# Patient Record
Sex: MALE | Race: WHITE | Employment: FULL TIME | URBAN - METROPOLITAN AREA
[De-identification: names, ages, dates, MRNs, and addresses within clinical notes are randomized per-mention and may not be internally consistent; named-entity substitution may affect disease eponyms.]

---

## 2017-07-04 ENCOUNTER — HOSPITAL ENCOUNTER (INPATIENT)
Facility: HOSPITAL | Age: 37
LOS: 6 days | Discharge: HOME | DRG: 432 | End: 2017-07-10
Attending: EMERGENCY MEDICINE | Admitting: HOSPITALIST
Payer: MEDICAID

## 2017-07-04 DIAGNOSIS — K70.31 ALCOHOLIC CIRRHOSIS OF LIVER WITH ASCITES (HCC): ICD-10-CM

## 2017-07-04 DIAGNOSIS — R17 JAUNDICE: Primary | ICD-10-CM

## 2017-07-04 PROBLEM — N17.9 ACUTE RENAL FAILURE (HCC): Status: ACTIVE | Noted: 2017-07-04

## 2017-07-04 PROBLEM — D68.9 COAGULOPATHY (HCC): Status: ACTIVE | Noted: 2017-07-04

## 2017-07-04 PROBLEM — E80.6 HYPERBILIRUBINEMIA: Status: ACTIVE | Noted: 2017-07-04

## 2017-07-04 LAB
ALBUMIN SERPL-MCNC: 2.4 G/DL (ref 3.5–4.8)
ALP LIVER SERPL-CCNC: 229 U/L (ref 45–117)
ALT SERPL-CCNC: 41 U/L (ref 17–63)
AST SERPL-CCNC: 111 U/L (ref 15–41)
BASOPHILS # BLD AUTO: 0.12 X10(3) UL (ref 0–0.1)
BASOPHILS NFR BLD AUTO: 0.8 %
BILIRUB SERPL-MCNC: 26.5 MG/DL (ref 0.1–2)
BUN BLD-MCNC: 12 MG/DL (ref 8–20)
CALCIUM BLD-MCNC: 8.8 MG/DL (ref 8.3–10.3)
CHLORIDE: 95 MMOL/L (ref 101–111)
CO2: 29 MMOL/L (ref 22–32)
CREAT BLD-MCNC: 1.36 MG/DL (ref 0.7–1.3)
EOSINOPHIL # BLD AUTO: 0.09 X10(3) UL (ref 0–0.3)
EOSINOPHIL NFR BLD AUTO: 0.6 %
ERYTHROCYTE [DISTWIDTH] IN BLOOD BY AUTOMATED COUNT: 15.2 % (ref 11.5–16)
GLUCOSE BLD-MCNC: 119 MG/DL (ref 70–99)
HCT VFR BLD AUTO: 37.5 % (ref 37–53)
HGB BLD-MCNC: 13.8 G/DL (ref 13–17)
IMMATURE GRANULOCYTE COUNT: 0.22 X10(3) UL (ref 0–1)
IMMATURE GRANULOCYTE RATIO %: 1.5 %
INR BLD: 1.98 (ref 0.89–1.11)
LIPASE: 414 U/L (ref 73–393)
LYMPHOCYTES # BLD AUTO: 0.96 X10(3) UL (ref 0.9–4)
LYMPHOCYTES NFR BLD AUTO: 6.7 %
M PROTEIN MFR SERPL ELPH: 6.4 G/DL (ref 6.1–8.3)
MCH RBC QN AUTO: 34.9 PG (ref 27–33.2)
MCHC RBC AUTO-ENTMCNC: 36.8 G/DL (ref 31–37)
MCV RBC AUTO: 94.9 FL (ref 80–99)
MONOCYTES # BLD AUTO: 1.28 X10(3) UL (ref 0.1–0.6)
MONOCYTES NFR BLD AUTO: 9 %
NEUTROPHIL ABS PRELIM: 11.56 X10 (3) UL (ref 1.3–6.7)
NEUTROPHILS # BLD AUTO: 11.56 X10(3) UL (ref 1.3–6.7)
NEUTROPHILS NFR BLD AUTO: 81.4 %
PLATELET # BLD AUTO: 162 10(3)UL (ref 150–450)
POTASSIUM SERPL-SCNC: 3.4 MMOL/L (ref 3.6–5.1)
PSA SERPL DL<=0.01 NG/ML-MCNC: 22.8 SECONDS (ref 12–14.3)
RBC # BLD AUTO: 3.95 X10(6)UL (ref 4.3–5.7)
RED CELL DISTRIBUTION WIDTH-SD: 52.7 FL (ref 35.1–46.3)
SODIUM SERPL-SCNC: 133 MMOL/L (ref 136–144)
WBC # BLD AUTO: 14.2 X10(3) UL (ref 4–13)

## 2017-07-04 PROCEDURE — 99223 1ST HOSP IP/OBS HIGH 75: CPT | Performed by: HOSPITALIST

## 2017-07-04 RX ORDER — MORPHINE SULFATE 4 MG/ML
2 INJECTION, SOLUTION INTRAMUSCULAR; INTRAVENOUS EVERY 2 HOUR PRN
Status: DISCONTINUED | OUTPATIENT
Start: 2017-07-04 | End: 2017-07-09

## 2017-07-04 RX ORDER — ONDANSETRON 2 MG/ML
4 INJECTION INTRAMUSCULAR; INTRAVENOUS EVERY 6 HOURS PRN
Status: DISCONTINUED | OUTPATIENT
Start: 2017-07-04 | End: 2017-07-10

## 2017-07-04 RX ORDER — MORPHINE SULFATE 4 MG/ML
4 INJECTION, SOLUTION INTRAMUSCULAR; INTRAVENOUS EVERY 2 HOUR PRN
Status: DISCONTINUED | OUTPATIENT
Start: 2017-07-04 | End: 2017-07-09

## 2017-07-04 RX ORDER — SODIUM PHOSPHATE, DIBASIC AND SODIUM PHOSPHATE, MONOBASIC 7; 19 G/133ML; G/133ML
1 ENEMA RECTAL ONCE AS NEEDED
Status: DISCONTINUED | OUTPATIENT
Start: 2017-07-04 | End: 2017-07-10

## 2017-07-04 RX ORDER — MORPHINE SULFATE 4 MG/ML
4 INJECTION, SOLUTION INTRAMUSCULAR; INTRAVENOUS ONCE
Status: COMPLETED | OUTPATIENT
Start: 2017-07-04 | End: 2017-07-04

## 2017-07-04 RX ORDER — POLYETHYLENE GLYCOL 3350 17 G/17G
17 POWDER, FOR SOLUTION ORAL DAILY PRN
Status: DISCONTINUED | OUTPATIENT
Start: 2017-07-04 | End: 2017-07-10

## 2017-07-04 RX ORDER — MORPHINE SULFATE 4 MG/ML
1 INJECTION, SOLUTION INTRAMUSCULAR; INTRAVENOUS EVERY 2 HOUR PRN
Status: DISCONTINUED | OUTPATIENT
Start: 2017-07-04 | End: 2017-07-09

## 2017-07-04 RX ORDER — DOCUSATE SODIUM 100 MG/1
100 CAPSULE, LIQUID FILLED ORAL 2 TIMES DAILY
Status: DISCONTINUED | OUTPATIENT
Start: 2017-07-04 | End: 2017-07-10

## 2017-07-04 RX ORDER — POTASSIUM CHLORIDE 20 MEQ/1
40 TABLET, EXTENDED RELEASE ORAL EVERY 4 HOURS
Status: COMPLETED | OUTPATIENT
Start: 2017-07-04 | End: 2017-07-04

## 2017-07-04 RX ORDER — BISACODYL 10 MG
10 SUPPOSITORY, RECTAL RECTAL
Status: DISCONTINUED | OUTPATIENT
Start: 2017-07-04 | End: 2017-07-10

## 2017-07-04 NOTE — ED PROVIDER NOTES
Patient Seen in: [facility] Emergency Department    History   Patient presents with:  Abdomen/Flank Pain (GI/)  Jaundice (gastrointestinal, hematologic)    Stated Complaint: abdominal pain x 2 weeks, jaundice    HPI    60-year-old white male who p conjunctiva is clear. Sclerae is icteric. Neck is supple. Lungs are clear to auscultation bilaterally.   Heart is regular rate and rhythm without murmur gallop or rub    Abdomen is soft distended nontender to deep palpation there is no rebound or guard Please view results for these tests on the individual orders.    URINALYSIS WITH CULTURE REFLEX   RAINBOW DRAW BLUE   RAINBOW DRAW GOLD   RAINBOW DRAW LAVENDER   RAINBOW DRAW LIGHT GREEN       ================================================

## 2017-07-04 NOTE — ED INITIAL ASSESSMENT (HPI)
Pt c/o jaundice and abdominal pain for past 2-weeks. Pt presents w/ abdominal distention. Pt hx alcoholism. Pt has been sober for 16-days.

## 2017-07-04 NOTE — PLAN OF CARE
NURSING ADMISSION NOTE      Patient admitted via Cart   Oriented to room. Safety precautions initiated. Bed in low position. Call light in reach. Admission interview completed. Dr. Emma Joseph called for admission orders.

## 2017-07-04 NOTE — H&P
JUNG HOSPITALIST  History and Physical     Negar Dena Patient Status:  Inpatient    1980 MRN [id number]   [facility] 3NW-A Attending Merline Hartshorn [address] Day # 0 PCP Taras Ellis MD     Chief Complaint: Jaundice Pulse 93   Temp 98.5 °F (36.9 °C) (Oral)   Resp 20   Ht 5' 11\" (1.803 m)   Wt 168 lb (76.2 kg)   SpO2 99%   BMI 23.43 kg/m²   General: No acute distress. Alert and oriented x 3. HEENT: Normocephalic atraumatic. Moist mucous membranes. EOM-I. PERRLA.  An withdrawal. Will still observe for any symptoms. 4. Hyponatremia-mild form hypokalemia-we will replace per protocol. 5. Renal failure-possible hepatorenal component. Will continue to monitor kidney function.         Quality:  · DVT Prophylaxis: SCD's  ·

## 2017-07-05 ENCOUNTER — APPOINTMENT (OUTPATIENT)
Dept: ULTRASOUND IMAGING | Facility: HOSPITAL | Age: 37
DRG: 432 | End: 2017-07-05
Attending: HOSPITALIST
Payer: MEDICAID

## 2017-07-05 ENCOUNTER — APPOINTMENT (OUTPATIENT)
Dept: ULTRASOUND IMAGING | Facility: HOSPITAL | Age: 37
DRG: 432 | End: 2017-07-05
Attending: NURSE PRACTITIONER
Payer: MEDICAID

## 2017-07-05 LAB
ALBUMIN SERPL-MCNC: 2.3 G/DL (ref 3.5–4.8)
ALBUMIN, OTHER BODY FLUID: 0.8 G/DL
ALP LIVER SERPL-CCNC: 227 U/L (ref 45–117)
ALT SERPL-CCNC: 38 U/L (ref 17–63)
AST SERPL-CCNC: 114 U/L (ref 15–41)
BASOPHIL PERITONEAL FLUID: 0 %
BASOPHILS # BLD AUTO: 0.09 X10(3) UL (ref 0–0.1)
BASOPHILS NFR BLD AUTO: 0.6 %
BILIRUB SERPL-MCNC: 26 MG/DL (ref 0.1–2)
BUN BLD-MCNC: 11 MG/DL (ref 8–20)
CALCIUM BLD-MCNC: 8.9 MG/DL (ref 8.3–10.3)
CHLORIDE: 98 MMOL/L (ref 101–111)
CO2: 27 MMOL/L (ref 22–32)
CREAT BLD-MCNC: 1.18 MG/DL (ref 0.7–1.3)
EOSINOPHIL # BLD AUTO: 0.11 X10(3) UL (ref 0–0.3)
EOSINOPHIL NFR BLD AUTO: 0.8 %
EOSINOPHILS PERITONEAL FLUID: 0 %
ERYTHROCYTE [DISTWIDTH] IN BLOOD BY AUTOMATED COUNT: 15.7 % (ref 11.5–16)
GLUCOSE BLD-MCNC: 80 MG/DL (ref 70–99)
GLUCOSE UR STRIP.AUTO-MCNC: NEGATIVE MG/DL
HCT VFR BLD AUTO: 35.2 % (ref 37–53)
HGB BLD-MCNC: 12.7 G/DL (ref 13–17)
IMMATURE GRANULOCYTE COUNT: 0.27 X10(3) UL (ref 0–1)
IMMATURE GRANULOCYTE RATIO %: 1.9 %
INR BLD: 1.87 (ref 0.89–1.11)
KETONES UR STRIP.AUTO-MCNC: NEGATIVE MG/DL
LEUKOCYTE ESTERASE UR QL STRIP.AUTO: NEGATIVE
LYMPHOCYTE PERITONEAL FLUID: 37 %
LYMPHOCYTES # BLD AUTO: 1.39 X10(3) UL (ref 0.9–4)
LYMPHOCYTES NFR BLD AUTO: 9.6 %
M PROTEIN MFR SERPL ELPH: 6.2 G/DL (ref 6.1–8.3)
MCH RBC QN AUTO: 34.8 PG (ref 27–33.2)
MCHC RBC AUTO-ENTMCNC: 36.1 G/DL (ref 31–37)
MCV RBC AUTO: 96.4 FL (ref 80–99)
MESOTHELIAL PERITONEAL FLUID: 4 %
MONO/MAC/HISTIO PERITONEAL: 49 %
MONOCYTES # BLD AUTO: 1.44 X10(3) UL (ref 0.1–0.6)
MONOCYTES NFR BLD AUTO: 9.9 %
NEUTROPHIL ABS PRELIM: 11.19 X10 (3) UL (ref 1.3–6.7)
NEUTROPHILS # BLD AUTO: 11.19 X10(3) UL (ref 1.3–6.7)
NEUTROPHILS NFR BLD AUTO: 77.2 %
NEUTROPHILS PERITONEAL FLUID: 10 %
NITRITE UR QL STRIP.AUTO: NEGATIVE
PH UR STRIP.AUTO: 5 [PH] (ref 4.5–8)
PLATELET # BLD AUTO: 139 10(3)UL (ref 150–450)
POTASSIUM SERPL-SCNC: 4.4 MMOL/L (ref 3.6–5.1)
PROT UR STRIP.AUTO-MCNC: NEGATIVE MG/DL
PSA SERPL DL<=0.01 NG/ML-MCNC: 21.8 SECONDS (ref 12–14.3)
RBC # BLD AUTO: 3.65 X10(6)UL (ref 4.3–5.7)
RBC PERITONEAL FLUID: <3000 /MM3
RBC UR QL AUTO: NEGATIVE
RED CELL DISTRIBUTION WIDTH-SD: 55.5 FL (ref 35.1–46.3)
SODIUM SERPL-SCNC: 132 MMOL/L (ref 136–144)
SP GR UR STRIP.AUTO: 1.02 (ref 1–1.03)
TOTAL CELLS COUNTED: 100
TOTAL PROTEIN, OTHER BODY FLUID: 1.6 G/DL
UROBILINOGEN UR STRIP.AUTO-MCNC: 4 MG/DL
WBC # BLD AUTO: 14.5 X10(3) UL (ref 4–13)
WBC PERITONEAL FLUID: 67 /MM3

## 2017-07-05 PROCEDURE — 99232 SBSQ HOSP IP/OBS MODERATE 35: CPT | Performed by: HOSPITALIST

## 2017-07-05 PROCEDURE — 81003 URINALYSIS AUTO W/O SCOPE: CPT | Performed by: HOSPITALIST

## 2017-07-05 PROCEDURE — 0W9G3ZZ DRAINAGE OF PERITONEAL CAVITY, PERCUTANEOUS APPROACH: ICD-10-PCS | Performed by: RADIOLOGY

## 2017-07-05 PROCEDURE — 49083 ABD PARACENTESIS W/IMAGING: CPT | Performed by: NURSE PRACTITIONER

## 2017-07-05 PROCEDURE — 76705 ECHO EXAM OF ABDOMEN: CPT | Performed by: HOSPITALIST

## 2017-07-05 RX ORDER — DOXEPIN HYDROCHLORIDE 50 MG/1
1 CAPSULE ORAL DAILY
Status: DISCONTINUED | OUTPATIENT
Start: 2017-07-05 | End: 2017-07-10

## 2017-07-05 RX ORDER — FOLIC ACID 1 MG/1
1 TABLET ORAL DAILY
Status: DISCONTINUED | OUTPATIENT
Start: 2017-07-05 | End: 2017-07-10

## 2017-07-05 NOTE — CM/SW NOTE
DIANNE reviewed chart. Patient has stated to staff he replied to [facility], has submitted all his documentation.   DIANNE spoke to [facility] @ 11:26am- he will review chart and f/u with MSW.    11:45am: [facility] called back, will come see patient today to screen for medicaid

## 2017-07-05 NOTE — PAYOR COMM NOTE
--------------  ADMISSION REVIEW     Payor: N/A      7/4  ED    39YEARS OLD  Patient Seen in: [facility] Emergency Department     History   Patient presents with:  Abdomen/Flank Pain (GI/)  Jaundice      Stated Complaint: abdominal pain x 2 weeks, Bilirubin, Total 26.5 (*)       Albumin 2.4 (*)       Sodium 133 (*)       Potassium 3.4 (*)       Chloride 95 (*)       All other components within normal limits   PROTHROMBIN TIME (PT) - Abnormal; Notable for the following:      PT 22.8 (*)       INR 1.9

## 2017-07-05 NOTE — PLAN OF CARE
GASTROINTESTINAL - ADULT    • Minimal or absence of nausea and vomiting Progressing    • Maintains adequate nutritional intake (undernourished) Progressing        METABOLIC/FLUID AND ELECTROLYTES - ADULT    • Electrolytes maintained within normal limits Pr

## 2017-07-05 NOTE — CONSULTS
[facility]                       Gastroenterology [address] [facility] Gastroenterology    Oskar Andujar Patient Status:  Inpatient    1980 MRN [id number]   [facility] 3NW-A Attending Ayo Lazaro MD   [facility] Day # 1 PCP Arleene Homans currently requiring IV narcotics for his back pain and has not experienced periods of confusion or overt GI bleeding  PMHx: History reviewed. No pertinent past medical history. PSHx: History reviewed. No pertinent surgical history.   Medications:   [COMPLE The patient reports no easy bruising, frequent gum bleeding or nose bleeding;   The patient has no history of known chronic anemia            Dermatologic: The patient reports no recent rashes or chronic skin disorders            Rheumatologic: The patient 07/05/2017   CL 98 07/05/2017   CO2 27.0 07/05/2017   GLU 80 07/05/2017   CA 8.9 07/05/2017   ALB 2.3 07/05/2017   ALKPHO 227 07/05/2017   BILT 26.0 07/05/2017    07/05/2017   ALT 38 07/05/2017   INR 1.98 07/04/2017   PTP 22.8 07/04/2017    0 culture, cytology, albumin, and protein; albumin replacement pending total volume removed   2. STAT INR, blood cultures, UA, acute hepatitis panel; will consider FFP if INR above 2  3. Consider starting Prednisolone pending infectious work-up   4.  EGD unde

## 2017-07-05 NOTE — PROGRESS NOTES
JUNG HOSPITALIST  Progress Note     Alexys Karlene Patient Status:  Inpatient    1980 MRN [id number]   [facility] 3NW-A Attending Yazmin Soto MD   Hosp Day # 1 PCP Meeta Hills MD     Chief Complaint: jaundice    S: Patient f in detail with him  2. Motivated to continue with abstinence  3. Hypokalemia  1. Resolved  4. Hyponatremia  5. Coagulopathy  1. Monitor  6. Reactive Leukocytosis  7. thrombocytopenia    Plan of care: as above.  Paracentesis today    Quality:  · DVT Prophyla

## 2017-07-06 ENCOUNTER — ANESTHESIA EVENT (OUTPATIENT)
Dept: ENDOSCOPY | Facility: HOSPITAL | Age: 37
DRG: 432 | End: 2017-07-06
Payer: MEDICAID

## 2017-07-06 LAB
ALBUMIN SERPL-MCNC: 2.1 G/DL (ref 3.5–4.8)
ALP LIVER SERPL-CCNC: 217 U/L (ref 45–117)
ALT SERPL-CCNC: 35 U/L (ref 17–63)
AST SERPL-CCNC: 108 U/L (ref 15–41)
BASOPHILS # BLD AUTO: 0.09 X10(3) UL (ref 0–0.1)
BASOPHILS NFR BLD AUTO: 0.6 %
BILIRUB SERPL-MCNC: 24.7 MG/DL (ref 0.1–2)
BUN BLD-MCNC: 12 MG/DL (ref 8–20)
CALCIUM BLD-MCNC: 8.8 MG/DL (ref 8.3–10.3)
CHLORIDE: 97 MMOL/L (ref 101–111)
CO2: 25 MMOL/L (ref 22–32)
CREAT BLD-MCNC: 1.22 MG/DL (ref 0.7–1.3)
EOSINOPHIL # BLD AUTO: 0.13 X10(3) UL (ref 0–0.3)
EOSINOPHIL NFR BLD AUTO: 0.9 %
ERYTHROCYTE [DISTWIDTH] IN BLOOD BY AUTOMATED COUNT: 15.5 % (ref 11.5–16)
GLUCOSE BLD-MCNC: 90 MG/DL (ref 70–99)
HAV IGM SER QL: 2.2 MG/DL (ref 1.7–3)
HAV IGM SER QL: NONREACTIVE
HBV CORE IGM SER QL: NONREACTIVE
HBV SURFACE AG SERPL QL IA: NONREACTIVE
HCT VFR BLD AUTO: 34.9 % (ref 37–53)
HEPATITIS C VIRUS AB INTERPRETATION: NONREACTIVE
HGB BLD-MCNC: 12.9 G/DL (ref 13–17)
IMMATURE GRANULOCYTE COUNT: 0.29 X10(3) UL (ref 0–1)
IMMATURE GRANULOCYTE RATIO %: 2 %
INR BLD: 1.99 (ref 0.89–1.11)
LYMPHOCYTES # BLD AUTO: 1.29 X10(3) UL (ref 0.9–4)
LYMPHOCYTES NFR BLD AUTO: 8.8 %
M PROTEIN MFR SERPL ELPH: 5.7 G/DL (ref 6.1–8.3)
MCH RBC QN AUTO: 35.1 PG (ref 27–33.2)
MCHC RBC AUTO-ENTMCNC: 37 G/DL (ref 31–37)
MCV RBC AUTO: 95.1 FL (ref 80–99)
MONOCYTES # BLD AUTO: 1.6 X10(3) UL (ref 0.1–0.6)
MONOCYTES NFR BLD AUTO: 11 %
NEUTROPHIL ABS PRELIM: 11.21 X10 (3) UL (ref 1.3–6.7)
NEUTROPHILS # BLD AUTO: 11.21 X10(3) UL (ref 1.3–6.7)
NEUTROPHILS NFR BLD AUTO: 76.7 %
PLATELET # BLD AUTO: 132 10(3)UL (ref 150–450)
POTASSIUM SERPL-SCNC: 4.4 MMOL/L (ref 3.6–5.1)
PSA SERPL DL<=0.01 NG/ML-MCNC: 22.9 SECONDS (ref 12–14.3)
RBC # BLD AUTO: 3.67 X10(6)UL (ref 4.3–5.7)
RED CELL DISTRIBUTION WIDTH-SD: 54.1 FL (ref 35.1–46.3)
SODIUM SERPL-SCNC: 130 MMOL/L (ref 136–144)
WBC # BLD AUTO: 14.6 X10(3) UL (ref 4–13)

## 2017-07-06 PROCEDURE — 99232 SBSQ HOSP IP/OBS MODERATE 35: CPT | Performed by: HOSPITALIST

## 2017-07-06 RX ORDER — FUROSEMIDE 40 MG/1
40 TABLET ORAL DAILY
Status: DISCONTINUED | OUTPATIENT
Start: 2017-07-06 | End: 2017-07-08

## 2017-07-06 RX ORDER — HYDROCODONE BITARTRATE AND ACETAMINOPHEN 5; 325 MG/1; MG/1
2 TABLET ORAL EVERY 4 HOURS PRN
Status: DISCONTINUED | OUTPATIENT
Start: 2017-07-06 | End: 2017-07-08

## 2017-07-06 RX ORDER — SPIRONOLACTONE 25 MG/1
100 TABLET ORAL DAILY
Status: DISCONTINUED | OUTPATIENT
Start: 2017-07-06 | End: 2017-07-08

## 2017-07-06 RX ORDER — TRAMADOL HYDROCHLORIDE 50 MG/1
50 TABLET ORAL EVERY 6 HOURS PRN
Status: DISCONTINUED | OUTPATIENT
Start: 2017-07-06 | End: 2017-07-10

## 2017-07-06 RX ORDER — HYDROCODONE BITARTRATE AND ACETAMINOPHEN 5; 325 MG/1; MG/1
1 TABLET ORAL EVERY 4 HOURS PRN
Status: DISCONTINUED | OUTPATIENT
Start: 2017-07-06 | End: 2017-07-08

## 2017-07-06 NOTE — ANESTHESIA PREPROCEDURE EVALUATION
PRE-OP EVALUATION    Patient Name: Naye Rios    Pre-op Diagnosis: Alcoholic cirrhosis of liver with ascites ([facility] [K70.31]    Procedure(s):  ESOPHAGOGASTRODUODENOSCOPY     Surgeon(s) and Role:     * Eran Fabian, DO - Primary    Pre-op vitals rev 7/4/17 encounter [facility] Encounter). Allergies: Review of patient's allergies indicates no known allergies. Anesthesia Evaluation    Patient summary reviewed.     Anesthetic Complications  (-) history of anesthetic complications         GI/Hepatic RIsks of aspiration, n/v, pain, dental trauma, awareness. Risks and plan d/w pt and pt's family - all questions answered.   Plan/risks discussed with: patient                Present on Admission:  • Coagulopathy ([address]  • Hyperbilirubinemia  • Acute renal f

## 2017-07-06 NOTE — PLAN OF CARE
PT A/O, 96% ON RA, REFUSED SCDS, ABDOMEN DISTENDED FIRM, PT STATES IT FEELS MUCH BETTER, DRESSING TO ABDOMEN WITH OLD DRAINAGE, VOIDING, TAKING MORPHINE FOR BACK PAIN WITH RELIEF, LABS THIS AM, TOLERATING REGULAR DIET  GASTROINTESTINAL - ADULT    • Minimal

## 2017-07-06 NOTE — PROGRESS NOTES
[facility]    Gastroenterology Follow-Up Note      Nidia Moralez Patient Status:  Inpatient    1980 MRN [id number]   [facility] 3NW-A Attending Kesha Villanueva MD   Hosp Day # 2 PCP Sven Mensah MD     Chief Complaint/Reaso with Dr. Ino Solis, transplant hepatology  *Strict etoh avoidance  *MVI, folic acid daily      I have spent > 26 total minutes with the patient >50% of visit was spent in counseling and coordination of care.       Krysten See  Gastroenterology/Advanced Endoscop

## 2017-07-06 NOTE — PROGRESS NOTES
JUNG HOSPITALIST  Progress Note     [facility] Patient Status:  Inpatient    1980 MRN [id number]   [facility] 3NW-A Attending Cecy Huff MD   Hosp Day # 2 PCP Phuc Cabrera MD     Chief Complaint: jaundice    S: Patient f Oral Daily   • spironolactone  100 mg Oral Daily   • multivitamin  1 tablet Oral Daily   • folic acid  1 mg Oral Daily   • docusate sodium  100 mg Oral BID       ASSESSMENT / PLAN:     1. Acute Alcohol Hepatitis with ascites and jaundice  1.  GI folllowing

## 2017-07-07 ENCOUNTER — ANESTHESIA (OUTPATIENT)
Dept: ENDOSCOPY | Facility: HOSPITAL | Age: 37
DRG: 432 | End: 2017-07-07
Payer: MEDICAID

## 2017-07-07 ENCOUNTER — SURGERY (OUTPATIENT)
Age: 37
End: 2017-07-07

## 2017-07-07 ENCOUNTER — APPOINTMENT (OUTPATIENT)
Dept: ULTRASOUND IMAGING | Facility: HOSPITAL | Age: 37
DRG: 432 | End: 2017-07-07
Attending: HOSPITALIST
Payer: MEDICAID

## 2017-07-07 LAB
ALBUMIN SERPL-MCNC: 2 G/DL (ref 3.5–4.8)
ALP LIVER SERPL-CCNC: 219 U/L (ref 45–117)
ALT SERPL-CCNC: 33 U/L (ref 17–63)
ANTIBODY SCREEN: NEGATIVE
AST SERPL-CCNC: 99 U/L (ref 15–41)
BASOPHILS # BLD AUTO: 0.08 X10(3) UL (ref 0–0.1)
BASOPHILS NFR BLD AUTO: 0.6 %
BILIRUB SERPL-MCNC: 25 MG/DL (ref 0.1–2)
BUN BLD-MCNC: 14 MG/DL (ref 8–20)
CALCIUM BLD-MCNC: 9 MG/DL (ref 8.3–10.3)
CHLORIDE: 93 MMOL/L (ref 101–111)
CO2: 26 MMOL/L (ref 22–32)
CREAT BLD-MCNC: 1.45 MG/DL (ref 0.7–1.3)
EOSINOPHIL # BLD AUTO: 0.14 X10(3) UL (ref 0–0.3)
EOSINOPHIL NFR BLD AUTO: 1 %
ERYTHROCYTE [DISTWIDTH] IN BLOOD BY AUTOMATED COUNT: 15.6 % (ref 11.5–16)
GLUCOSE BLD-MCNC: 84 MG/DL (ref 70–99)
HAV IGM SER QL: 2 MG/DL (ref 1.7–3)
HCT VFR BLD AUTO: 34.9 % (ref 37–53)
HGB BLD-MCNC: 12.8 G/DL (ref 13–17)
IMMATURE GRANULOCYTE COUNT: 0.38 X10(3) UL (ref 0–1)
IMMATURE GRANULOCYTE RATIO %: 2.8 %
INR BLD: 2.03 (ref 0.89–1.11)
LYMPHOCYTES # BLD AUTO: 1.21 X10(3) UL (ref 0.9–4)
LYMPHOCYTES NFR BLD AUTO: 8.9 %
M PROTEIN MFR SERPL ELPH: 5.6 G/DL (ref 6.1–8.3)
MCH RBC QN AUTO: 34.8 PG (ref 27–33.2)
MCHC RBC AUTO-ENTMCNC: 36.7 G/DL (ref 31–37)
MCV RBC AUTO: 94.8 FL (ref 80–99)
MONOCYTES # BLD AUTO: 1.53 X10(3) UL (ref 0.1–0.6)
MONOCYTES NFR BLD AUTO: 11.2 %
NEUTROPHIL ABS PRELIM: 10.3 X10 (3) UL (ref 1.3–6.7)
NEUTROPHILS # BLD AUTO: 10.3 X10(3) UL (ref 1.3–6.7)
NEUTROPHILS NFR BLD AUTO: 75.5 %
PLATELET # BLD AUTO: 114 10(3)UL (ref 150–450)
POTASSIUM SERPL-SCNC: 4 MMOL/L (ref 3.6–5.1)
PSA SERPL DL<=0.01 NG/ML-MCNC: 23.3 SECONDS (ref 12–14.3)
RBC # BLD AUTO: 3.68 X10(6)UL (ref 4.3–5.7)
RED CELL DISTRIBUTION WIDTH-SD: 54.2 FL (ref 35.1–46.3)
RH BLOOD TYPE: POSITIVE
SODIUM SERPL-SCNC: 130 MMOL/L (ref 136–144)
WBC # BLD AUTO: 13.6 X10(3) UL (ref 4–13)

## 2017-07-07 PROCEDURE — 76700 US EXAM ABDOM COMPLETE: CPT | Performed by: HOSPITALIST

## 2017-07-07 PROCEDURE — 0DJ08ZZ INSPECTION OF UPPER INTESTINAL TRACT, VIA NATURAL OR ARTIFICIAL OPENING ENDOSCOPIC: ICD-10-PCS | Performed by: INTERNAL MEDICINE

## 2017-07-07 PROCEDURE — 99232 SBSQ HOSP IP/OBS MODERATE 35: CPT | Performed by: HOSPITALIST

## 2017-07-07 PROCEDURE — 93975 VASCULAR STUDY: CPT | Performed by: HOSPITALIST

## 2017-07-07 RX ORDER — PROPRANOLOL HYDROCHLORIDE 10 MG/1
10 TABLET ORAL 2 TIMES DAILY
Status: DISCONTINUED | OUTPATIENT
Start: 2017-07-07 | End: 2017-07-10

## 2017-07-07 RX ORDER — SODIUM CHLORIDE 9 MG/ML
INJECTION, SOLUTION INTRAVENOUS ONCE
Status: DISCONTINUED | OUTPATIENT
Start: 2017-07-07 | End: 2017-07-10

## 2017-07-07 RX ORDER — ALBUMIN (HUMAN) 12.5 G/50ML
100 SOLUTION INTRAVENOUS ONCE
Status: COMPLETED | OUTPATIENT
Start: 2017-07-07 | End: 2017-07-07

## 2017-07-07 NOTE — PROGRESS NOTES
JUNG HOSPITALIST  Progress Note     Yolanda Freeman Patient Status:  Inpatient    1980 MRN [id number]   [facility] 3NW-A Attending Juvencio Campos MD   Hosp Day # 3 PCP Torin Mendoza MD     Chief Complaint: jaundice    S: Patient f Propranolol HCl  10 mg Oral BID   • Albumin Human  100 mL Intravenous Once   • furosemide  40 mg Oral Daily   • spironolactone  100 mg Oral Daily   • multivitamin  1 tablet Oral Daily   • folic acid  1 mg Oral Daily   • docusate sodium  100 mg Oral BID

## 2017-07-07 NOTE — PROGRESS NOTES
Patient sent for EGD. FFP continues without complication. Accompanied patient to Endo Lab with FFP running. FFP completed in Endo lab without complication. VSS. Afebrile.

## 2017-07-07 NOTE — INTERVAL H&P NOTE
Pre-op Diagnosis: Alcoholic cirrhosis of liver with ascites ([facility] [K70.31]    The above referenced H&P was reviewed by Medhat Mansfield DO on 7/7/2017, the patient was examined and no significant changes have occurred in the patient's condition since the [facility] (DP/SNF)

## 2017-07-07 NOTE — ANESTHESIA POSTPROCEDURE EVALUATION
[address] Patient Status:  Inpatient   Age/Gender 39year old male MRN [id number]   Location [facility]. Attending Cecy Huff MD   Hosp Day # 3 PCP Phuc Cabrera MD       Anesthesia Post-op Note    Procedure(s):

## 2017-07-07 NOTE — PROGRESS NOTES
Dr Valerio General contacted and informed of new critical result per US of Portal Vein Thrombosis. Verified orders to continue with FFP.

## 2017-07-07 NOTE — OPERATIVE REPORT
EGD       Keren Harvey Patient Status:  Inpatient    1980 MRN [id number]   [facility] ENDOSCOPY Attending Partha Bell MD   Hosp Day # 3 PCP Randy Barbosa MD       PREOPERATIVE DIAGNOSIS/INDICATION: Rocio Valencia A distal esophagitis. RECOMMENDATIONS:    1. Opt for propranolol 10 mg bid for esophageal varices rather than banding given coagulopathy. Will need to titrate as tolerated as outpatient. 2. Albumin infusion now for increasing creatinine.  May need to

## 2017-07-07 NOTE — H&P (VIEW-ONLY)
[facility]                       Gastroenterology [address] [facility] Gastroenterology    Payton Lester Patient Status:  Inpatient    1980 MRN [id number]   [facility] 3NW-A Attending Ligia Stanlye MD   [facility] Day # 1 SHANITA Alarcon currently requiring IV narcotics for his back pain and has not experienced periods of confusion or overt GI bleeding  PMHx: History reviewed. No pertinent past medical history. PSHx: History reviewed. No pertinent surgical history.   Medications:   [COMPLE The patient reports no easy bruising, frequent gum bleeding or nose bleeding;   The patient has no history of known chronic anemia            Dermatologic: The patient reports no recent rashes or chronic skin disorders            Rheumatologic: The patient 07/05/2017   CL 98 07/05/2017   CO2 27.0 07/05/2017   GLU 80 07/05/2017   CA 8.9 07/05/2017   ALB 2.3 07/05/2017   ALKPHO 227 07/05/2017   BILT 26.0 07/05/2017    07/05/2017   ALT 38 07/05/2017   INR 1.98 07/04/2017   PTP 22.8 07/04/2017    0 culture, cytology, albumin, and protein; albumin replacement pending total volume removed   2. STAT INR, blood cultures, UA, acute hepatitis panel; will consider FFP if INR above 2  3. Consider starting Prednisolone pending infectious work-up   4.  EGD unde

## 2017-07-08 ENCOUNTER — APPOINTMENT (OUTPATIENT)
Dept: MRI IMAGING | Facility: HOSPITAL | Age: 37
DRG: 432 | End: 2017-07-08
Attending: INTERNAL MEDICINE
Payer: MEDICAID

## 2017-07-08 LAB
ALBUMIN SERPL-MCNC: 2.2 G/DL (ref 3.5–4.8)
ALP LIVER SERPL-CCNC: 211 U/L (ref 45–117)
ALT SERPL-CCNC: 31 U/L (ref 17–63)
APTT PPP: 55.1 SECONDS (ref 25–34)
AST SERPL-CCNC: 89 U/L (ref 15–41)
BASOPHILS # BLD AUTO: 0.06 X10(3) UL (ref 0–0.1)
BASOPHILS NFR BLD AUTO: 0.5 %
BILIRUB SERPL-MCNC: 23.7 MG/DL (ref 0.1–2)
BLOOD TYPE BARCODE: 6200
BUN BLD-MCNC: 19 MG/DL (ref 8–20)
CALCIUM BLD-MCNC: 8.9 MG/DL (ref 8.3–10.3)
CHLORIDE: 94 MMOL/L (ref 101–111)
CO2: 28 MMOL/L (ref 22–32)
CREAT BLD-MCNC: 1.68 MG/DL (ref 0.7–1.3)
EOSINOPHIL # BLD AUTO: 0.19 X10(3) UL (ref 0–0.3)
EOSINOPHIL NFR BLD AUTO: 1.5 %
ERYTHROCYTE [DISTWIDTH] IN BLOOD BY AUTOMATED COUNT: 15.7 % (ref 11.5–16)
FIBRINOGEN: 278 MG/DL (ref 200–446)
GLUCOSE BLD-MCNC: 94 MG/DL (ref 70–99)
HCT VFR BLD AUTO: 33.9 % (ref 37–53)
HGB BLD-MCNC: 12.4 G/DL (ref 13–17)
IMMATURE GRANULOCYTE COUNT: 0.32 X10(3) UL (ref 0–1)
IMMATURE GRANULOCYTE RATIO %: 2.5 %
INR BLD: 1.99 (ref 0.89–1.11)
LYMPHOCYTES # BLD AUTO: 1.27 X10(3) UL (ref 0.9–4)
LYMPHOCYTES NFR BLD AUTO: 9.9 %
M PROTEIN MFR SERPL ELPH: 5.6 G/DL (ref 6.1–8.3)
MCH RBC QN AUTO: 34.7 PG (ref 27–33.2)
MCHC RBC AUTO-ENTMCNC: 36.6 G/DL (ref 31–37)
MCV RBC AUTO: 95 FL (ref 80–99)
MONOCYTES # BLD AUTO: 1.58 X10(3) UL (ref 0.1–0.6)
MONOCYTES NFR BLD AUTO: 12.3 %
NEUTROPHIL ABS PRELIM: 9.41 X10 (3) UL (ref 1.3–6.7)
NEUTROPHILS # BLD AUTO: 9.41 X10(3) UL (ref 1.3–6.7)
NEUTROPHILS NFR BLD AUTO: 73.3 %
NON GYNE INTERPRETATION: NEGATIVE
PLATELET # BLD AUTO: 118 10(3)UL (ref 150–450)
POTASSIUM SERPL-SCNC: 3.7 MMOL/L (ref 3.6–5.1)
PSA SERPL DL<=0.01 NG/ML-MCNC: 22.9 SECONDS (ref 12–14.3)
RBC # BLD AUTO: 3.57 X10(6)UL (ref 4.3–5.7)
RED CELL DISTRIBUTION WIDTH-SD: 54.4 FL (ref 35.1–46.3)
SODIUM SERPL-SCNC: 132 MMOL/L (ref 136–144)
WBC # BLD AUTO: 12.8 X10(3) UL (ref 4–13)

## 2017-07-08 PROCEDURE — 74183 MRI ABD W/O CNTR FLWD CNTR: CPT | Performed by: INTERNAL MEDICINE

## 2017-07-08 PROCEDURE — 99232 SBSQ HOSP IP/OBS MODERATE 35: CPT | Performed by: HOSPITALIST

## 2017-07-08 PROCEDURE — 99223 1ST HOSP IP/OBS HIGH 75: CPT | Performed by: INTERNAL MEDICINE

## 2017-07-08 RX ORDER — HYDROCODONE BITARTRATE AND ACETAMINOPHEN 5; 325 MG/1; MG/1
1 TABLET ORAL EVERY 4 HOURS PRN
Status: DISCONTINUED | OUTPATIENT
Start: 2017-07-08 | End: 2017-07-10

## 2017-07-08 RX ORDER — HYDROCODONE BITARTRATE AND ACETAMINOPHEN 5; 325 MG/1; MG/1
2 TABLET ORAL EVERY 4 HOURS PRN
Status: DISCONTINUED | OUTPATIENT
Start: 2017-07-08 | End: 2017-07-10

## 2017-07-08 RX ORDER — POTASSIUM CHLORIDE 20 MEQ/1
40 TABLET, EXTENDED RELEASE ORAL ONCE
Status: COMPLETED | OUTPATIENT
Start: 2017-07-08 | End: 2017-07-08

## 2017-07-08 NOTE — PLAN OF CARE
GASTROINTESTINAL - ADULT    • Minimal or absence of nausea and vomiting Progressing    • Maintains or returns to baseline bowel function Progressing    • Maintains adequate nutritional intake (undernourished) Progressing            Assumed pt care at 0730.

## 2017-07-08 NOTE — PROGRESS NOTES
JUNG HOSPITALIST  Progress Note     Francia Yi Patient Status:  Inpatient    1980 MRN [id number]   [facility] 3NW-A Attending Ernie Magaña MD   Hosp Day # 4 PCP Serena Hawkins MD     Chief Complaint: Hepatitis   S:  S/p ta chloride   Intravenous Once   • Propranolol HCl  10 mg Oral BID   • spironolactone  100 mg Oral Daily   • multivitamin  1 tablet Oral Daily   • folic acid  1 mg Oral Daily   • docusate sodium  100 mg Oral BID     ASSESSMENT / PLAN:   1.  Acute ETOH hepatiti

## 2017-07-08 NOTE — CONSULTS
[facility]    Report of Consultation    Elvin Haddad Patient Status:  Inpatient    1980 MRN [id number]   [facility] 3NW-A Attending Solomon Valle MD   [facility] Day # 4 PCP Selene Mitchell MD     Date of Admission:  2017  Da 50 mg, Oral, Q6H PRN  •  multivitamin (ADULT) tab 1 tablet, 1 tablet, Oral, Daily  •  folic acid (FOLVITE) tab 1 mg, 1 mg, Oral, Daily  •  morphINE sulfate (PF) 4 MG/ML injection 1 mg, 1 mg, Intravenous, Q2H PRN **OR** morphINE sulfate (PF) 4 MG/ML injecti WBC 12.8 07/08/2017   HGB 12.4 07/08/2017   HCT 33.9 07/08/2017   .0 07/08/2017   CREATSERUM 1.68 07/08/2017   BUN 19 07/08/2017    07/08/2017   K 3.7 07/08/2017   CL 94 07/08/2017   CO2 28.0 07/08/2017   GLU 94 07/08/2017   CA 8.9 07/08/201 threatening to involve the splanchnic circulation would warrant a discussion of anticoagulation. The selection of anticoagulant would be difficult, given the elevation of both the INR and PTT.   In the short term, will send a mixing study to assess the rev

## 2017-07-08 NOTE — PROGRESS NOTES
Gastroenterology Progress Note  Patient Name: Keren Harvey  Chief Complaint: Alcohol hepatitis  S: The patient reports that he feels \"ok\" today. No n/v. No abdominal pain. He is tolerating a solid diet.     O: /65 (BP Location: Right arm)   Pul TECHNIQUE:  Real time gray-scale ultrasound was used to evaluate the abdomen. B-mode images, Doppler color flow, and spectral waveform analysis were performed of the portal vein, hepatic artery, hepatic vein, and splenic vein.   The exam includes images Approved by: Mya Villa MD    Assessment: Patient with alcohol hepatitis. He may have underlying cirrhosis, as he has been drinking regularly and to excess for at least 6 years, and likely longer. He has no GI bleed, or evidence of infection.   His u/s

## 2017-07-09 LAB
ALBUMIN SERPL-MCNC: 2 G/DL (ref 3.5–4.8)
ALP LIVER SERPL-CCNC: 207 U/L (ref 45–117)
ALT SERPL-CCNC: 31 U/L (ref 17–63)
AST SERPL-CCNC: 93 U/L (ref 15–41)
BAND %: 4 %
BASOPHIL % MANUAL: 0 %
BASOPHIL ABSOLUTE MANUAL: 0 X10(3) UL (ref 0–0.1)
BILIRUB SERPL-MCNC: 21.8 MG/DL (ref 0.1–2)
BUN BLD-MCNC: 25 MG/DL (ref 8–20)
CALCIUM BLD-MCNC: 8.9 MG/DL (ref 8.3–10.3)
CHLORIDE: 94 MMOL/L (ref 101–111)
CO2: 27 MMOL/L (ref 22–32)
CREAT BLD-MCNC: 1.85 MG/DL (ref 0.7–1.3)
EOSINOPHIL % MANUAL: 0 %
EOSINOPHIL ABSOLUTE MANUAL: 0 X10(3) UL (ref 0–0.3)
ERYTHROCYTE [DISTWIDTH] IN BLOOD BY AUTOMATED COUNT: 15.7 % (ref 11.5–16)
GLUCOSE BLD-MCNC: 125 MG/DL (ref 70–99)
HCT VFR BLD AUTO: 38 % (ref 37–53)
HGB BLD-MCNC: 14 G/DL (ref 13–17)
LYMPHOCYTE % MANUAL: 5 %
LYMPHOCYTE ABSOLUTE MANUAL: 1.01 X10(3) UL (ref 0.9–4)
M PROTEIN MFR SERPL ELPH: 5.6 G/DL (ref 6.1–8.3)
MCH RBC QN AUTO: 34.7 PG (ref 27–33.2)
MCHC RBC AUTO-ENTMCNC: 36.8 G/DL (ref 31–37)
MCV RBC AUTO: 94.1 FL (ref 80–99)
MONOCYTE % MANUAL: 4 %
MONOCYTE ABSOLUTE MANUAL: 0.81 X10(3) UL (ref 0.1–0.6)
NEUTROPHIL ABS PRELIM: 17.11 X10 (3) UL (ref 1.3–6.7)
NEUTROPHIL ABSOLUTE MANUAL: 18.38 X10(3) UL (ref 1.3–6.7)
NEUTROPHILS % MANUAL: 87 %
PLATELET # BLD AUTO: 151 10(3)UL (ref 150–450)
PLATELET MORPHOLOGY: NORMAL
POTASSIUM SERPL-SCNC: 4.3 MMOL/L (ref 3.6–5.1)
PT 1:1 MIX ONE HOUR: 14.9 SECOND (ref 12–14.3)
PT 1:1 MIX ZERO HOUR: 14.6 SECOND (ref 12–14.3)
PT NEW 1:1 MIX ONE HOUR: 14.7 SECOND (ref 12–14.3)
PT ZERO HOUR: 24.8 SECOND (ref 12–14.3)
PTT 1:1 MIX 0 HOUR: 34.9 SECOND (ref 25–34)
PTT 1:1 MIX 1 HOUR: 37.1 SECOND (ref 25–34)
PTT NEW 1:1 MIX 1 HOUR: 35.4 SECOND (ref 25–34)
PTT ZERO HOUR: 51.4 SECOND (ref 25–34)
PTTHEPZ ZERO HOUR: 48.4 SECOND (ref 25–34)
RBC # BLD AUTO: 4.04 X10(6)UL (ref 4.3–5.7)
RED CELL DISTRIBUTION WIDTH-SD: 54 FL (ref 35.1–46.3)
SODIUM SERPL-SCNC: 130 MMOL/L (ref 136–144)
SODIUM SERPL-SCNC: 6 MMOL/L
TOTAL CELLS COUNTED: 100
WBC # BLD AUTO: 20.2 X10(3) UL (ref 4–13)

## 2017-07-09 PROCEDURE — 99232 SBSQ HOSP IP/OBS MODERATE 35: CPT | Performed by: INTERNAL MEDICINE

## 2017-07-09 PROCEDURE — 84300 ASSAY OF URINE SODIUM: CPT | Performed by: INTERNAL MEDICINE

## 2017-07-09 PROCEDURE — 99232 SBSQ HOSP IP/OBS MODERATE 35: CPT | Performed by: HOSPITALIST

## 2017-07-09 RX ORDER — SODIUM CHLORIDE 9 MG/ML
1500 INJECTION, SOLUTION INTRAVENOUS ONCE
Status: DISCONTINUED | OUTPATIENT
Start: 2017-07-09 | End: 2017-07-10

## 2017-07-09 NOTE — PROGRESS NOTES
JUNG HOSPITALIST  Progress Note     Roland Jackson Patient Status:  Inpatient    1980 MRN [id number]   [facility] 3NW-A Attending Juan Antonio Goodson MD   Hosp Day # 5 PCP Juan Alberto Pires MD     Chief Complaint: Hepatitis   S:  tap si Intravenous Once   • prednisoLONE  40 mg Oral Daily   • sodium chloride   Intravenous Once   • Propranolol HCl  10 mg Oral BID   • multivitamin  1 tablet Oral Daily   • folic acid  1 mg Oral Daily   • docusate sodium  100 mg Oral BID     ASSESSMENT / PLAN:

## 2017-07-09 NOTE — PROGRESS NOTES
Gastroenterology Progress Note  Patient Name: Alexys Soler  Chief Complaint: Alcohol hepatitis  S: The patient reports feeling well. No abdominal pain. No n/v overnight. He is tolerating a soft diet. No fevers.    O: BP 99/59 (BP Location: Right arm) demargination effect from the prednisolone. His Tbili has improved. His Na has remained low but stable. He has good mentation, and is otherwise stable. Plan:  Will give 1.5 liters 0.9 NS to see if we can correct suspected volume contraction

## 2017-07-09 NOTE — PROGRESS NOTES
Assumed care of pt at 1900. Pt alert and oriented x4. VSS,  Pt denies chest pain and SOB. Skin and sclera jaundice noted. Right surgical puncture site noted with 125 cc clear francine drainage. Ostomy bag in place. Lortab given for pain with relief.   IV

## 2017-07-09 NOTE — PROGRESS NOTES
Dr. Ion Burt paged regarding BP med. Waiting for call back.     0721: ok to give BP meds this am. Recheck BP after med admin. Will give am dose. Report given to [address] [place] [address] with order to recheck BP.

## 2017-07-09 NOTE — PLAN OF CARE
Problem: Patient/Family Goals  Goal: Patient/Family Short Term Goal  Patient's Short Term Goal: plan for discharge    Interventions:   - poc discussed with dr Jocelin Johnson & pt  - See additional Care Plan goals for specific interventions   Outcome: Progressing  N opioid side effects  - Notify MD/LIP if interventions unsuccessful or patient reports new pain   Outcome: Progressing  Spoke with dr Marcos Patel @ this time re: pain management.   md states to d/c morphine

## 2017-07-09 NOTE — PLAN OF CARE
GASTROINTESTINAL - ADULT    • Minimal or absence of nausea and vomiting Progressing        METABOLIC/FLUID AND ELECTROLYTES - ADULT    • Electrolytes maintained within normal limits Progressing    • Hemodynamic stability and optimal renal function maintain

## 2017-07-09 NOTE — PROGRESS NOTES
[facility]    Progress Note    Alexys Soler Patient Status:  Inpatient    1980 MRN [id number]   [facility] 3NW-A Attending Clari Stanton MD   Hosp Day # 5 PCP Meeta Hills MD     Subjective:  Alexys Soler is a(n) 39 y vein.  The risk of anticoagulation outweighs the possible benefit. Recommend repeat doppler of the liver in 3 months to eval for progression/resolution. I will see him back at that time. Coagulopathy: Reverses on mixing study.  Consistent with reduced

## 2017-07-09 NOTE — PROGRESS NOTES
Dr Fontenot Never here. States he is going to sign off for now. States pt should have a f/u aptm with him in 3 months.

## 2017-07-10 VITALS
WEIGHT: 168 LBS | SYSTOLIC BLOOD PRESSURE: 101 MMHG | OXYGEN SATURATION: 95 % | HEART RATE: 70 BPM | DIASTOLIC BLOOD PRESSURE: 68 MMHG | TEMPERATURE: 98 F | BODY MASS INDEX: 23.52 KG/M2 | HEIGHT: 71 IN | RESPIRATION RATE: 18 BRPM

## 2017-07-10 LAB
ALBUMIN SERPL-MCNC: 1.9 G/DL (ref 3.5–4.8)
ALP LIVER SERPL-CCNC: 213 U/L (ref 45–117)
ALT SERPL-CCNC: 31 U/L (ref 17–63)
AST SERPL-CCNC: 73 U/L (ref 15–41)
BAND %: 4 %
BASOPHIL % MANUAL: 0 %
BASOPHIL ABSOLUTE MANUAL: 0 X10(3) UL (ref 0–0.1)
BASOPHIL PERITONEAL FLUID: 0 %
BILIRUB SERPL-MCNC: 17.6 MG/DL (ref 0.1–2)
BUN BLD-MCNC: 27 MG/DL (ref 8–20)
CALCIUM BLD-MCNC: 8.7 MG/DL (ref 8.3–10.3)
CHLORIDE: 97 MMOL/L (ref 101–111)
CO2: 26 MMOL/L (ref 22–32)
CREAT BLD-MCNC: 1.63 MG/DL (ref 0.7–1.3)
EOSINOPHIL % MANUAL: 1 %
EOSINOPHIL ABSOLUTE MANUAL: 0.24 X10(3) UL (ref 0–0.3)
EOSINOPHILS PERITONEAL FLUID: 0 %
ERYTHROCYTE [DISTWIDTH] IN BLOOD BY AUTOMATED COUNT: 16.3 % (ref 11.5–16)
GLUCOSE BLD-MCNC: 124 MG/DL (ref 70–99)
HCT VFR BLD AUTO: 36.6 % (ref 37–53)
HGB BLD-MCNC: 13.3 G/DL (ref 13–17)
LYMPHOCYTE % MANUAL: 3 %
LYMPHOCYTE ABSOLUTE MANUAL: 0.72 X10(3) UL (ref 0.9–4)
LYMPHOCYTE PERITONEAL FLUID: 82 %
M PROTEIN MFR SERPL ELPH: 5.3 G/DL (ref 6.1–8.3)
MCH RBC QN AUTO: 34.5 PG (ref 27–33.2)
MCHC RBC AUTO-ENTMCNC: 36.3 G/DL (ref 31–37)
MCV RBC AUTO: 95.1 FL (ref 80–99)
MESOTHELIAL PERITONEAL FLUID: 1 %
METAMYELOCYTE %: 2 %
METAMYELOCYTE ABSOLUTE MANUAL: 0.48 X10(3) UL (ref ?–0.01)
MONO/MAC/HISTIO PERITONEAL: 11 %
MONOCYTE % MANUAL: 7 %
MONOCYTE ABSOLUTE MANUAL: 1.67 X10(3) UL (ref 0.1–0.6)
NEUTROPHIL ABS PRELIM: 19.09 X10 (3) UL (ref 1.3–6.7)
NEUTROPHIL ABSOLUTE MANUAL: 20.79 X10(3) UL (ref 1.3–6.7)
NEUTROPHILS % MANUAL: 83 %
NEUTROPHILS PERITONEAL FLUID: 6 %
PLATELET # BLD AUTO: 151 10(3)UL (ref 150–450)
POTASSIUM SERPL-SCNC: 3.9 MMOL/L (ref 3.6–5.1)
RBC # BLD AUTO: 3.85 X10(6)UL (ref 4.3–5.7)
RBC PERITONEAL FLUID: <3000 /MM3
RED CELL DISTRIBUTION WIDTH-SD: 56.5 FL (ref 35.1–46.3)
SODIUM SERPL-SCNC: 134 MMOL/L (ref 136–144)
SODIUM SERPL-SCNC: 18 MMOL/L
TOTAL CELLS COUNTED: 100
TOTAL CELLS COUNTED: 100
WBC # BLD AUTO: 23.9 X10(3) UL (ref 4–13)
WBC PERITONEAL FLUID: 26 /MM3

## 2017-07-10 PROCEDURE — 87077 CULTURE AEROBIC IDENTIFY: CPT | Performed by: INTERNAL MEDICINE

## 2017-07-10 PROCEDURE — 99239 HOSP IP/OBS DSCHRG MGMT >30: CPT | Performed by: HOSPITALIST

## 2017-07-10 PROCEDURE — 84300 ASSAY OF URINE SODIUM: CPT | Performed by: INTERNAL MEDICINE

## 2017-07-10 PROCEDURE — 87070 CULTURE OTHR SPECIMN AEROBIC: CPT | Performed by: INTERNAL MEDICINE

## 2017-07-10 PROCEDURE — 87186 SC STD MICRODIL/AGAR DIL: CPT | Performed by: INTERNAL MEDICINE

## 2017-07-10 PROCEDURE — 30233L1 TRANSFUSION OF NONAUTOLOGOUS FRESH PLASMA INTO PERIPHERAL VEIN, PERCUTANEOUS APPROACH: ICD-10-PCS | Performed by: HOSPITALIST

## 2017-07-10 PROCEDURE — 87205 SMEAR GRAM STAIN: CPT | Performed by: INTERNAL MEDICINE

## 2017-07-10 RX ORDER — HYDROCODONE BITARTRATE AND ACETAMINOPHEN 5; 325 MG/1; MG/1
1 TABLET ORAL EVERY 4 HOURS PRN
Qty: 20 TABLET | Refills: 0 | Status: SHIPPED | OUTPATIENT
Start: 2017-07-10 | End: 2017-07-24

## 2017-07-10 RX ORDER — PROPRANOLOL HYDROCHLORIDE 10 MG/1
10 TABLET ORAL 2 TIMES DAILY
Qty: 60 TABLET | Refills: 3 | Status: SHIPPED | OUTPATIENT
Start: 2017-07-10 | End: 2017-08-09

## 2017-07-10 NOTE — PROGRESS NOTES
Gastroenterology Progress Note  Patient Name: Juan Mora  Chief Complaint: Alcohol hepatitis, abnormal creatinine  S: The patient reports no n/v or abdominal pain overnight.   The drainage of ascites is decreasing from the right lower quadrant ostom yesterday. His resultant repeat creatinine is improved. His follow-up urine Na was 6, which suggests that he may have a component of underlying HRS.     Plan: Await repeat Urine Na today             Continue Prednisolone for 28 days             Resend asc

## 2017-07-10 NOTE — PROGRESS NOTES
JUNG HOSPITALIST  Progress Note     Arvind Lim Patient Status:  Inpatient    1980 MRN [id number]   [facility] 3NW-A Attending Alia Rivera MD   Hosp Day # 6 PCP Airam Avalos MD     Chief Complaint: Hepatitis   S:  Tap si 40 mg Oral Daily   • sodium chloride   Intravenous Once   • Propranolol HCl  10 mg Oral BID   • multivitamin  1 tablet Oral Daily   • folic acid  1 mg Oral Daily   • docusate sodium  100 mg Oral BID     ASSESSMENT / PLAN:   1.  Acute ETOH hepatitis with asc

## 2017-07-11 NOTE — DISCHARGE SUMMARY
[facility] HOSPITALIST  DISCHARGE SUMMARY     Leandra Mckeon Patient Status:  Inpatient    1980 MRN [id number]   [facility] 3NW-A Attending No att. providers found   Hosp Day # 6 PCP Cortes Gasca MD     Date of Admission: 2017  Torsten states for the last several weeks has been increased in his abdominal girth. Patient states for the last week he has been getting more and more jaundiced. Patient denies any abdominal pain is complaining of some back discomfort.   No swelling in the lower Consultants:  • GI, Hem    Discharge Medication List:     Discharge Medications      START taking these medications      Instructions Prescription details   HYDROcodone-acetaminophen 5-325 MG Tabs  Commonly known as:  NORCO      Take 1 tablet by mouth

## 2017-07-12 ENCOUNTER — PATIENT OUTREACH (OUTPATIENT)
Dept: CASE MANAGEMENT | Age: 37
End: 2017-07-12

## 2017-07-13 ENCOUNTER — APPOINTMENT (OUTPATIENT)
Dept: CT IMAGING | Facility: HOSPITAL | Age: 37
DRG: 433 | End: 2017-07-13
Attending: EMERGENCY MEDICINE
Payer: MEDICAID

## 2017-07-13 PROCEDURE — 74176 CT ABD & PELVIS W/O CONTRAST: CPT | Performed by: EMERGENCY MEDICINE

## 2017-07-14 ENCOUNTER — APPOINTMENT (OUTPATIENT)
Dept: ULTRASOUND IMAGING | Facility: HOSPITAL | Age: 37
DRG: 433 | End: 2017-07-14
Attending: STUDENT IN AN ORGANIZED HEALTH CARE EDUCATION/TRAINING PROGRAM
Payer: MEDICAID

## 2017-07-14 ENCOUNTER — APPOINTMENT (OUTPATIENT)
Dept: ULTRASOUND IMAGING | Facility: HOSPITAL | Age: 37
DRG: 433 | End: 2017-07-14
Attending: INTERNAL MEDICINE
Payer: MEDICAID

## 2017-07-14 PROBLEM — K70.31 ASCITES DUE TO ALCOHOLIC CIRRHOSIS (HCC): Status: ACTIVE | Noted: 2017-07-14

## 2017-07-14 PROCEDURE — 49083 ABD PARACENTESIS W/IMAGING: CPT | Performed by: INTERNAL MEDICINE

## 2017-07-14 PROCEDURE — 93971 EXTREMITY STUDY: CPT | Performed by: STUDENT IN AN ORGANIZED HEALTH CARE EDUCATION/TRAINING PROGRAM

## 2017-07-14 PROCEDURE — 81003 URINALYSIS AUTO W/O SCOPE: CPT | Performed by: HOSPITALIST

## 2017-07-14 NOTE — DIETARY NOTE
NUTRITION INITIAL ASSESSMENT    Pt is at moderate nutrition risk. Pt does not meet malnutrition criteria.     NUTRITION DIAGNOSIS/PROBLEM:    Inadequate oral intake related to inability to consume sufficient energy as evidenced by 20-30lb wt loss over a yea mass    MEDICATIONS:  Noted    LABS:  Noted    FOLLOW-UP DATE: 7/19/17    Sulma Leung MS, RD, LDN  Pager [phone]

## 2017-07-14 NOTE — ED PROVIDER NOTES
Patient Seen in: [facility] Emergency Department    History   Patient presents with:  Abdomen/Flank Pain (GI/)    Stated Complaint: abd pain, swelling- dx'd last week    HPI    Austin Santos is a 40-year-old male presenting to the emergency department for distress  HEENT exam: Tympanic membranes are clear. Canals are normal with no auricular preauricular or mastoid tenderness. Oropharyngeal exam shows no uvula edema or shift.   There is no tongue elevation and palatine tonsils show no purulent material or ---------                               -----------         ------                     CBC W/ DIFFERENTIAL[[id number]]          Abnormal            Final result                 Please view results for these tests on the individual orders.

## 2017-07-14 NOTE — PLAN OF CARE
NURSING ADMISSION NOTE      Patient admitted via cart. Oriented to room. Safety precautions initiated. Bed in low position. Call light in reach.     HEMATOLOGIC - ADULT    • Maintains hematologic stability Progressing    • Free from bleeding injury

## 2017-07-14 NOTE — H&P
JUNG HOSPITALIST  History and Physical     Marvmiguel Trammell Patient Status:  Observation    1980 MRN [id number]   [facility] 3NE-A Attending Maru Rodriguez MD   Hosp Day # 0 PCP Prerna Franco MD     Chief Complaint: Abdominal pres Alert and oriented x 3. HEENT: scleral icterus  Neck: No lymphadenopathy. No JVD. No carotid bruits. Respiratory: Clear to auscultation bilaterally. No wheezes. No rhonchi. Cardiovascular: S1, S2. Regular rate and rhythm. No murmurs, rubs or gallops.  Eq

## 2017-07-14 NOTE — PAYOR COMM NOTE
--------------  ADMISSION REVIEW       7/14    ED         Patient presents with:  Abdomen/Flank Pain      Stated Complaint: abd pain, swelling- dx'd last week          presenting to the emergency department for abdominal pain.     He says a history of cirrh   Lymphocyte Absolute Manual 0.38 (*)       Monocyte Absolute Manual 1.14 (*)       Metamyelocyte Absolute Manual 0.19 (*)       Myelocyte Absolute Manual 0.19 (*)       RBC Morphology See morphology below (*)       Macrocytosis Small (*)       Hypochrom

## 2017-07-14 NOTE — PROGRESS NOTES
Per [facility] orders,125g IV albumin infused prior to US guided paracentesis, tolerated well. VSS.

## 2017-07-14 NOTE — PROGRESS NOTES
IM addendum to Dr. Eduardo Agrawal H&P:    Pt seen and examined. C/o diffuse abdominal pain. Eager to eat. Denies nausea, vomiting.       /72 (BP Location: Left arm)   Pulse 68   Temp 98.1 °F (36.7 °C) (Oral)   Resp 16   Ht 5' 11\" (1.803 m)   Wt 178 lb 1

## 2017-07-14 NOTE — PROGRESS NOTES
[facility] Note:  Dose Adjustment for Levaquin (levofloxacin)    Izzy Arteaga is a 39year old male who has been prescribed Levaquin (levofloxacin) 500 mg IVPB every 24 hrs.   CrCl is estimated creatinine clearance is 75.1 mL/min (based on SCr of 1.43

## 2017-07-14 NOTE — PLAN OF CARE
PAIN - ADULT    • Verbalizes/displays adequate comfort level or patient's stated pain goal Progressing        RESPIRATORY - ADULT    • Achieves optimal ventilation and oxygenation Progressing        Patient is A&Ox4. VSS, afebrile.   Saturating 95% on RA,

## 2017-07-14 NOTE — CONSULTS
Gastroenterology Initial Consultation  I have personally seen and examined the patient.     Patient Name: Izzy Arteaga  Referring physician: Dr. Reece Scott  Reason for consultation: Abdominal pain, ascites, alcohol hepatitis  CC: Abdominal pain  HPI: This (LEVAQUIN) IVPB premix 750 mg 750 mg Intravenous Q24H   [COMPLETED] Albumin Human (ALBUMINAR) 25 % solution 100 g 100 g Intravenous Once   Albumin Human (ALBUMINAR) 25 % solution 25 g 25 g Intravenous Once   bumetanide (BUMEX) tab 2 mg 2 mg Oral Daily   HY Neurologic: The patient reports no history of seizure, stroke, or frequent headaches    PE: /79 (BP Location: Right arm)   Pulse 76   Temp 98.1 °F (36.7 °C) (Oral)   Resp 16   Ht 5' 11\" (1.803 m)   Wt 178 lb 12.8 oz (81.1 kg)   SpO2 97%   BMI 24.94 18.3*   NEPRELIM  16.66*   WBC  20.1*   PLT  107.0*       Recent Labs   Lab  07/13/17   2110  07/14/17   0004   ALT  65*   --    AST   --   113*     Ascites 7/10/2017: wbc: 26, culture: staph warneri / staph epidermidis    Impression:  This is a 38 yo male

## 2017-07-15 NOTE — PLAN OF CARE
HEMATOLOGIC - ADULT    • Maintains hematologic stability Progressing    • Free from bleeding injury Progressing        PAIN - ADULT    • Verbalizes/displays adequate comfort level or patient's stated pain goal Progressing        RESPIRATORY - ADULT    • Ac

## 2017-07-15 NOTE — PROGRESS NOTES
[facility] HOSPITALIST  Progress Note     Nidia Moralez Patient Status:  Inpatient    1980 MRN [id number]   [facility] 3NE-A Attending Claire Courtney MD   Hosp Day # 1 PCP Sven Mensah MD     Chief Complaint: Abdominal pain    S: Jennifer 23.6*   INR  1.99*  2.07*       No results for input(s): TROP, CK in the last 72 hours. Imaging: Imaging data reviewed in Epic.     Medications:   • prednisoLONE  40 mg Oral Daily   • Propranolol HCl  10 mg Oral BID   • bumetanide  2 mg Oral Daily

## 2017-07-15 NOTE — PROGRESS NOTES
Gastroenterology Progress Note  S: Feels ok, ankle swelling better, Had 3 liters removed with paracentesis.   O: /79 (BP Location: Left arm)   Pulse 87   Temp 98.3 °F (36.8 °C) (Oral)   Resp 20   Ht 180.3 cm (5' 11\")   Wt 167 lb 9.6 oz (76 kg)   SpO2

## 2017-07-15 NOTE — PLAN OF CARE
A/Ox4. Very pleasant and cooperative. On RA. No tele. Jaundice, yellow sclera noted. RLE +2 edema. US (-) for DVT. S/p paracentesis yesterday. Puncture site c/d/i. No drainage noted. K being replaced. Norco for pain. Up ad guicho.   Denies any needs at

## 2017-07-16 NOTE — PLAN OF CARE
HEMATOLOGIC - ADULT    • Maintains hematologic stability Progressing    • Free from bleeding injury Progressing        RESPIRATORY - ADULT    • Achieves optimal ventilation and oxygenation Progressing        Patient alert & oriented x 4.  RA, ambulates ind

## 2017-07-16 NOTE — PROGRESS NOTES
NURSING DISCHARGE NOTE    Discharged Home via Ambulatory. Accompanied by Support staff  Belongings Taken by patient/family. DC education/ instructions provided to pt and mother, including f/u appts. All questions answered.  Both verbalized Priddy

## 2017-07-16 NOTE — PLAN OF CARE
A/Ox4. Very pleasant and cooperative. Wants to go home today. On RA. No tele. Jaundice, yellow sclera noted. RLE +2 edema. US (-) for DVT. S/p RLQ paracentesis 7/14. Puncture site c/d/i. No drainage noted.   K being replaced per protocol  Norco for tesha

## 2017-07-16 NOTE — PROGRESS NOTES
[facility] H[facility]PITAL[facility]  Progress Note     Yolanda Freeman Patient Status:  Inpatient    1980 MRN [id number]   [facility] 3NE-A Attending Darion Hameed MD   Hosp Day # 2 PCP Torin Mendoza MD     Chief Complaint: Abdominal pain    S: No a 110*   ALT  65*   --    --    --   56  58   BILT  11.7*   --    --    --   10.7*  10.1*   TP  6.4   --    --    --   5.9*  5.8*    < > = values in this interval not displayed. Estimated Creatinine Clearance: 84.3 mL/min (based on SCr of 1.29 mg/dL).

## 2017-07-16 NOTE — PROGRESS NOTES
Gastroenterology Progress Note  S: Feels much better, swelling improved.    O: /63 (BP Location: Left arm)   Pulse 67   Temp 98.2 °F (36.8 °C) (Oral)   Resp 18   Ht 180.3 cm (5' 11\")   Wt 167 lb 9.6 oz (76 kg)   SpO2 98%   BMI 23.38 kg/m²   Gen: AAOx

## 2017-07-17 NOTE — DISCHARGE SUMMARY
[facility] HOSPITALIST  DISCHARGE SUMMARY     Donald Lam Patient Status:  Inpatient    1980 MRN [id number]   [facility] 3NE-A Attending No att. providers found   Hosp Day # 2 PCP Celine Arambula MD     Date of Admission: 2017  Da diuretics were continued on d/c and steroids continued. He will f/u with hepatologist, Dr. Lissa Samaniego tomorrow.     Procedures during hospitalization:   • Paracentesis    Incidental or significant findings and recommendations (brief descriptions):  • As above guarding. Neurologic: No focal neurological deficits. Musculoskeletal: Moves all extremities. Extremities: No edema.   -----------------------------------------------------------------------------------------------  PATIENT DISCHARGE INSTRUCTIONS: See e

## 2017-07-18 ENCOUNTER — TELEPHONE (OUTPATIENT)
Dept: INTERNAL MEDICINE CLINIC | Facility: CLINIC | Age: 37
End: 2017-07-18

## 2017-07-18 ENCOUNTER — PATIENT OUTREACH (OUTPATIENT)
Dept: CASE MANAGEMENT | Age: 37
End: 2017-07-18

## 2017-07-18 NOTE — PROGRESS NOTES
Multiple attempts to reach pt and messages left with no return call. Pt readmitted to [address] [place] [address] on 7/13/17. Encounter closing.

## 2017-07-18 NOTE — TELEPHONE ENCOUNTER
Patient was discharge home from [facility] on 7/16/17 and is at High risk for readmission and recommended for a hospital follow up (not TCM) appointment by 7/23/17. NCM attempted to schedule very limited available appointments.   Per discharge summary

## 2017-07-19 NOTE — TELEPHONE ENCOUNTER
The appointment with Julissa Sainz should have been made prior to discharge, why didn't that happen.   Please put him in with me in 30 min spot soon

## 2017-07-19 NOTE — TELEPHONE ENCOUNTER
Please advise if pt needs a sooner appt than 8/21/17 with Dr Back Mast f/u  Available with you is 7/27/17- please advise if this is ok or sooner appt

## 2017-07-20 NOTE — TELEPHONE ENCOUNTER
S/w pt. States he is doing [place]. \" He states he ran out of [employer] on Monday and having trouble managing abd pain. Denies any new distention-getting a little harder (but nothing like before, per pt). MACKENZIE has improved and has not returned.  Pt scheduled Hosp

## 2017-07-21 NOTE — TELEPHONE ENCOUNTER
S/w Pt who was informed that per AS pain should be manages by hepatology and if pain worsening pt should return to ER. Pt states that pain is less or about the same.  Pt will contact his hepatologist and verbalized understanding to seek ER if pain worsening

## 2017-07-21 NOTE — TELEPHONE ENCOUNTER
Left a detailed VM (at preferred number- ok per HIPPA)  informing Pt that his pain should be managed by hepatology. Pt was advised to seek ED if pain is worsening. Pt was advised to call clinic if any concerns or changes.

## 2017-07-24 ENCOUNTER — LAB ENCOUNTER (OUTPATIENT)
Dept: LAB | Age: 37
End: 2017-07-24
Attending: INTERNAL MEDICINE
Payer: MEDICAID

## 2017-07-24 ENCOUNTER — OFFICE VISIT (OUTPATIENT)
Dept: INTERNAL MEDICINE CLINIC | Facility: CLINIC | Age: 37
End: 2017-07-24

## 2017-07-24 VITALS
TEMPERATURE: 98 F | SYSTOLIC BLOOD PRESSURE: 108 MMHG | HEART RATE: 80 BPM | RESPIRATION RATE: 17 BRPM | DIASTOLIC BLOOD PRESSURE: 64 MMHG | BODY MASS INDEX: 22.26 KG/M2 | WEIGHT: 159 LBS | HEIGHT: 71 IN

## 2017-07-24 DIAGNOSIS — N17.8 ACUTE RENAL FAILURE WITH OTHER SPECIFIED PATHOLOGICAL LESION IN KIDNEY (HCC): ICD-10-CM

## 2017-07-24 DIAGNOSIS — I81 PORTAL VEIN THROMBOSIS: ICD-10-CM

## 2017-07-24 DIAGNOSIS — D69.6 THROMBOCYTOPENIA (HCC): ICD-10-CM

## 2017-07-24 DIAGNOSIS — R17 JAUNDICE: ICD-10-CM

## 2017-07-24 DIAGNOSIS — K70.10 ACUTE ALCOHOLIC HEPATITIS: ICD-10-CM

## 2017-07-24 DIAGNOSIS — Z09 HOSPITAL DISCHARGE FOLLOW-UP: ICD-10-CM

## 2017-07-24 DIAGNOSIS — R17 JAUNDICE: Primary | ICD-10-CM

## 2017-07-24 LAB
ALBUMIN SERPL-MCNC: 3 G/DL (ref 3.5–4.8)
ALP LIVER SERPL-CCNC: 225 U/L (ref 45–117)
ALT SERPL-CCNC: 97 U/L (ref 17–63)
AST SERPL-CCNC: 149 U/L (ref 15–41)
BASOPHILS # BLD AUTO: 0.1 X10(3) UL (ref 0–0.1)
BASOPHILS NFR BLD AUTO: 0.4 %
BILIRUB SERPL-MCNC: 7.5 MG/DL (ref 0.1–2)
BUN BLD-MCNC: 27 MG/DL (ref 8–20)
CALCIUM BLD-MCNC: 9 MG/DL (ref 8.3–10.3)
CHLORIDE: 99 MMOL/L (ref 101–111)
CO2: 30 MMOL/L (ref 22–32)
CREAT BLD-MCNC: 1.29 MG/DL (ref 0.7–1.3)
EOSINOPHIL # BLD AUTO: 0.12 X10(3) UL (ref 0–0.3)
EOSINOPHIL NFR BLD AUTO: 0.5 %
ERYTHROCYTE [DISTWIDTH] IN BLOOD BY AUTOMATED COUNT: 18.6 % (ref 11.5–16)
GLUCOSE BLD-MCNC: 92 MG/DL (ref 70–99)
HCT VFR BLD AUTO: 38.2 % (ref 37–53)
HGB BLD-MCNC: 13.3 G/DL (ref 13–17)
IMMATURE GRANULOCYTE COUNT: 0.41 X10(3) UL (ref 0–1)
IMMATURE GRANULOCYTE RATIO %: 1.8 %
LYMPHOCYTES # BLD AUTO: 1.36 X10(3) UL (ref 0.9–4)
LYMPHOCYTES NFR BLD AUTO: 6 %
M PROTEIN MFR SERPL ELPH: 7.4 G/DL (ref 6.1–8.3)
MCH RBC QN AUTO: 34.9 PG (ref 27–33.2)
MCHC RBC AUTO-ENTMCNC: 34.8 G/DL (ref 31–37)
MCV RBC AUTO: 100.3 FL (ref 80–99)
MONOCYTES # BLD AUTO: 1.87 X10(3) UL (ref 0.1–0.6)
MONOCYTES NFR BLD AUTO: 8.3 %
NEUTROPHIL ABS PRELIM: 18.69 X10 (3) UL (ref 1.3–6.7)
NEUTROPHILS # BLD AUTO: 18.69 X10(3) UL (ref 1.3–6.7)
NEUTROPHILS NFR BLD AUTO: 83 %
PLATELET # BLD AUTO: 117 10(3)UL (ref 150–450)
PLATELET MORPHOLOGY: NORMAL
POTASSIUM SERPL-SCNC: 3.7 MMOL/L (ref 3.6–5.1)
RBC # BLD AUTO: 3.81 X10(6)UL (ref 4.3–5.7)
RED CELL DISTRIBUTION WIDTH-SD: 68.3 FL (ref 35.1–46.3)
SODIUM SERPL-SCNC: 139 MMOL/L (ref 136–144)
WBC # BLD AUTO: 22.6 X10(3) UL (ref 4–13)

## 2017-07-24 PROCEDURE — 99214 OFFICE O/P EST MOD 30 MIN: CPT | Performed by: INTERNAL MEDICINE

## 2017-07-24 PROCEDURE — 80053 COMPREHEN METABOLIC PANEL: CPT

## 2017-07-24 PROCEDURE — 85025 COMPLETE CBC W/AUTO DIFF WBC: CPT

## 2017-07-24 RX ORDER — HYDROCODONE BITARTRATE AND ACETAMINOPHEN 5; 325 MG/1; MG/1
1 TABLET ORAL NIGHTLY PRN
Qty: 20 TABLET | Refills: 0 | Status: SHIPPED | OUTPATIENT
Start: 2017-07-24 | End: 2017-12-18 | Stop reason: ALTCHOICE

## 2017-07-24 NOTE — PROGRESS NOTES
HPI:    Mekhi Ace is a 39year old male here today for hospital follow up.    He was discharged from Inpatient hospital, [facility] to Home   Admit Date: 7/13/17  Discharge Date: 7/16/17  Hospital Discharge Diagnosis:    Ascites, alcoholic hepa Propranolol HCl 10 MG Oral Tab Take 1 tablet (10 mg total) by mouth 2 (two) times daily. prednisoLONE 5 MG Oral Tab Take 8 tablets (40 mg total) by mouth daily. No current facility-administered medications on file prior to visit.        HISTORY: rec adenopathy, no bruits  CHEST: no chest tenderness  LUNGS: clear to auscultation  CARDIO: RRR without murmur  GI: good BS's, nontender, +hepatomegaly  MUSCULOSKELETAL: back is not tender, FROM of the extremities  EXTREMITIES: no cyanosis, clubbing or edema severe  · Amount and/or Complexity of Data to Be Reviewed: moderate  · Risk of Significant Complications, Morbidity, and/or Mortality: severe    Overall Risk:   severe    Patient seen within 14 days from date of discharge.      Milind Bhardwaj MD, 7/24/2017

## 2017-07-25 ENCOUNTER — TELEPHONE (OUTPATIENT)
Dept: INTERNAL MEDICINE CLINIC | Facility: CLINIC | Age: 37
End: 2017-07-25

## 2017-07-25 NOTE — TELEPHONE ENCOUNTER
Message   Received: Yesterday   Message Contents   Cecy Guzmán MD  P [facility] Clinical Staff          Previous Messages      ----- Message -----   From: Leslie Turpin MD   Sent: 7/24/2017   4:43 PM   To: Bren Jackson MD     Yes, we will.  I am at E

## 2017-08-01 ENCOUNTER — HOSPITAL ENCOUNTER (OUTPATIENT)
Dept: ULTRASOUND IMAGING | Facility: HOSPITAL | Age: 37
Discharge: HOME | End: 2017-08-01
Attending: INTERNAL MEDICINE
Payer: MEDICAID

## 2017-08-01 DIAGNOSIS — R16.1 SPLENOMEGALY: ICD-10-CM

## 2017-08-01 DIAGNOSIS — E80.6 HYPERBILIRUBINEMIA: ICD-10-CM

## 2017-08-01 DIAGNOSIS — K70.10 ACUTE ALCOHOLIC HEPATITIS: ICD-10-CM

## 2017-08-01 DIAGNOSIS — K70.31 ASCITES DUE TO ALCOHOLIC CIRRHOSIS (HCC): ICD-10-CM

## 2017-08-01 PROCEDURE — 76700 US EXAM ABDOM COMPLETE: CPT | Performed by: INTERNAL MEDICINE

## 2017-08-01 PROCEDURE — 76705 ECHO EXAM OF ABDOMEN: CPT | Performed by: INTERNAL MEDICINE

## 2017-08-02 NOTE — PROGRESS NOTES
[facility]  [address], [address]  [address] [facility]  [phone] ([phone]) 20 tablet, Rfl: 0  •  bumetanide 2 MG Oral Tab, Take 1 tablet (2 mg total) by mouth daily. , Disp: 30 tablet, Rfl: 0  •  Propranolol HCl 10 MG Oral Tab, Take 1 tablet (10 mg total) by mouth 2 (two) times daily. , Disp: 60 tablet, Rfl: 3  •  prednisoLONE 5 MG

## 2017-08-03 ENCOUNTER — APPOINTMENT (OUTPATIENT)
Dept: LAB | Age: 37
End: 2017-08-03
Attending: INTERNAL MEDICINE
Payer: MEDICAID

## 2017-08-03 DIAGNOSIS — K70.10 ALCOHOLIC HEPATITIS WITHOUT ASCITES: ICD-10-CM

## 2017-08-03 LAB
ALBUMIN SERPL-MCNC: 3 G/DL (ref 3.5–4.8)
ALP LIVER SERPL-CCNC: 151 U/L (ref 45–117)
ALT SERPL-CCNC: 76 U/L (ref 17–63)
AST SERPL-CCNC: 103 U/L (ref 15–41)
BILIRUB SERPL-MCNC: 3.7 MG/DL (ref 0.1–2)
BUN BLD-MCNC: 18 MG/DL (ref 8–20)
CALCIUM BLD-MCNC: 9.6 MG/DL (ref 8.3–10.3)
CHLORIDE: 103 MMOL/L (ref 101–111)
CO2: 27 MMOL/L (ref 22–32)
CREAT BLD-MCNC: 1.06 MG/DL (ref 0.7–1.3)
GLUCOSE BLD-MCNC: 116 MG/DL (ref 70–99)
INR BLD: 1.74 (ref 0.89–1.11)
M PROTEIN MFR SERPL ELPH: 6.6 G/DL (ref 6.1–8.3)
POTASSIUM SERPL-SCNC: 3.4 MMOL/L (ref 3.6–5.1)
PSA SERPL DL<=0.01 NG/ML-MCNC: 20.6 SECONDS (ref 12–14.3)
SODIUM SERPL-SCNC: 139 MMOL/L (ref 136–144)

## 2017-08-03 PROCEDURE — 85025 COMPLETE CBC W/AUTO DIFF WBC: CPT | Performed by: NURSE PRACTITIONER

## 2017-08-03 PROCEDURE — 82552 ASSAY OF CPK IN BLOOD: CPT

## 2017-08-03 PROCEDURE — 36415 COLL VENOUS BLD VENIPUNCTURE: CPT | Performed by: NURSE PRACTITIONER

## 2017-08-03 PROCEDURE — 85027 COMPLETE CBC AUTOMATED: CPT | Performed by: NURSE PRACTITIONER

## 2017-08-03 PROCEDURE — 36415 COLL VENOUS BLD VENIPUNCTURE: CPT

## 2017-08-03 PROCEDURE — 85610 PROTHROMBIN TIME: CPT

## 2017-08-03 PROCEDURE — 80053 COMPREHEN METABOLIC PANEL: CPT

## 2017-08-03 PROCEDURE — 85007 BL SMEAR W/DIFF WBC COUNT: CPT | Performed by: NURSE PRACTITIONER

## 2017-08-03 PROCEDURE — 82550 ASSAY OF CK (CPK): CPT

## 2017-08-04 LAB — CK TOTAL: 19 U/L

## 2017-08-23 ENCOUNTER — APPOINTMENT (OUTPATIENT)
Dept: LAB | Age: 37
End: 2017-08-23
Attending: INTERNAL MEDICINE
Payer: MEDICAID

## 2017-08-23 DIAGNOSIS — K70.31 ALCOHOLIC CIRRHOSIS OF LIVER WITH ASCITES (HCC): ICD-10-CM

## 2017-08-23 LAB
ALBUMIN SERPL-MCNC: 3.6 G/DL (ref 3.5–4.8)
ALP LIVER SERPL-CCNC: 98 U/L (ref 45–117)
ALT SERPL-CCNC: 43 U/L (ref 17–63)
AST SERPL-CCNC: 47 U/L (ref 15–41)
BILIRUB SERPL-MCNC: 2.1 MG/DL (ref 0.1–2)
BUN BLD-MCNC: 12 MG/DL (ref 8–20)
CALCIUM BLD-MCNC: 9.8 MG/DL (ref 8.3–10.3)
CHLORIDE: 101 MMOL/L (ref 101–111)
CO2: 31 MMOL/L (ref 22–32)
CREAT BLD-MCNC: 1.09 MG/DL (ref 0.7–1.3)
GLUCOSE BLD-MCNC: 109 MG/DL (ref 70–99)
INR BLD: 1.63 (ref 0.89–1.11)
M PROTEIN MFR SERPL ELPH: 7.8 G/DL (ref 6.1–8.3)
POTASSIUM SERPL-SCNC: 3.2 MMOL/L (ref 3.6–5.1)
PSA SERPL DL<=0.01 NG/ML-MCNC: 19.5 SECONDS (ref 12–14.3)
SODIUM SERPL-SCNC: 139 MMOL/L (ref 136–144)

## 2017-08-23 PROCEDURE — 85610 PROTHROMBIN TIME: CPT

## 2017-08-23 PROCEDURE — 80053 COMPREHEN METABOLIC PANEL: CPT

## 2017-08-23 PROCEDURE — 36415 COLL VENOUS BLD VENIPUNCTURE: CPT

## 2017-08-31 ENCOUNTER — TELEPHONE (OUTPATIENT)
Dept: SURGERY | Facility: CLINIC | Age: 37
End: 2017-08-31

## 2017-08-31 NOTE — TELEPHONE ENCOUNTER
Spoke to patient, he is currently taking 1 tab of prednisone daily and taking bumex 2mg about once a week when his swelling is bad. Advised patient to stop taking prednisone and repeat labs in 1 month.  Patient's next appointment with hepatology on 09/20/17

## 2017-08-31 NOTE — TELEPHONE ENCOUNTER
----- Message from Melva Gamboa MD sent at 8/29/2017 12:04 AM CDT -----  Can you check in with him. Labs continue to improve. If he is doing OK then can repeat labs in 1 month at next visit. If any issues, let me know.  He should be off prednisone now an

## 2017-08-31 NOTE — TELEPHONE ENCOUNTER
----- Message from Johnny Frost MD sent at 8/29/2017 12:04 AM CDT -----  Can you check in with him. Labs continue to improve. If he is doing OK then can repeat labs in 1 month at next visit. If any issues, let me know.  He should be off prednisone now an

## 2017-08-31 NOTE — TELEPHONE ENCOUNTER
Left message for patient to call back yesterday and today. Want to check if he is off prednisone and weaning down on the diuretics, and to have him repeat labs in 1 month (orders in EPIC).

## 2017-09-20 ENCOUNTER — OFFICE VISIT (OUTPATIENT)
Dept: SURGERY | Facility: CLINIC | Age: 37
End: 2017-09-20

## 2017-09-20 ENCOUNTER — APPOINTMENT (OUTPATIENT)
Dept: LAB | Age: 37
End: 2017-09-20
Attending: NURSE PRACTITIONER
Payer: MEDICAID

## 2017-09-20 VITALS
SYSTOLIC BLOOD PRESSURE: 118 MMHG | DIASTOLIC BLOOD PRESSURE: 72 MMHG | BODY MASS INDEX: 24.92 KG/M2 | HEIGHT: 71 IN | HEART RATE: 100 BPM | TEMPERATURE: 98 F | WEIGHT: 178 LBS

## 2017-09-20 DIAGNOSIS — K70.10 ACUTE ALCOHOLIC HEPATITIS: ICD-10-CM

## 2017-09-20 LAB
ALBUMIN SERPL-MCNC: 3.4 G/DL (ref 3.5–4.8)
ALP LIVER SERPL-CCNC: 78 U/L (ref 45–117)
ALT SERPL-CCNC: 20 U/L (ref 17–63)
AST SERPL-CCNC: 33 U/L (ref 15–41)
BILIRUB SERPL-MCNC: 1.8 MG/DL (ref 0.1–2)
BUN BLD-MCNC: 5 MG/DL (ref 8–20)
CALCIUM BLD-MCNC: 10.1 MG/DL (ref 8.3–10.3)
CHLORIDE: 109 MMOL/L (ref 101–111)
CO2: 24 MMOL/L (ref 22–32)
CREAT BLD-MCNC: 1.03 MG/DL (ref 0.7–1.3)
GLUCOSE BLD-MCNC: 110 MG/DL (ref 70–99)
INR BLD: 1.77 (ref 0.89–1.11)
M PROTEIN MFR SERPL ELPH: 7.1 G/DL (ref 6.1–8.3)
POTASSIUM SERPL-SCNC: 4 MMOL/L (ref 3.6–5.1)
PSA SERPL DL<=0.01 NG/ML-MCNC: 20.8 SECONDS (ref 12–14.3)
SODIUM SERPL-SCNC: 142 MMOL/L (ref 136–144)

## 2017-09-20 PROCEDURE — 80053 COMPREHEN METABOLIC PANEL: CPT

## 2017-09-20 PROCEDURE — 36415 COLL VENOUS BLD VENIPUNCTURE: CPT

## 2017-09-20 PROCEDURE — 85610 PROTHROMBIN TIME: CPT

## 2017-09-20 NOTE — PROGRESS NOTES
[facility]  [address], [address]  [address]  [phone] ([phone]) Denies unusual weight loss (-), polyuria(-), polydipsia(-)  Neuro: loss of strength/weakness (-)     HISTORY:  Past Medical History:   Diagnosis Date   • Ascites    • Cirrhosis, alcoholic (HCC)    • Esophageal varices in cirrhosis (HCC)      Medications: (fax)  [email or web address]

## 2017-10-14 ENCOUNTER — APPOINTMENT (OUTPATIENT)
Dept: CT IMAGING | Facility: HOSPITAL | Age: 37
End: 2017-10-14
Attending: EMERGENCY MEDICINE
Payer: MEDICAID

## 2017-10-14 ENCOUNTER — HOSPITAL ENCOUNTER (EMERGENCY)
Facility: HOSPITAL | Age: 37
Discharge: HOME | End: 2017-10-14
Attending: EMERGENCY MEDICINE
Payer: MEDICAID

## 2017-10-14 VITALS
SYSTOLIC BLOOD PRESSURE: 105 MMHG | DIASTOLIC BLOOD PRESSURE: 60 MMHG | HEIGHT: 71 IN | WEIGHT: 175 LBS | HEART RATE: 89 BPM | OXYGEN SATURATION: 98 % | RESPIRATION RATE: 18 BRPM | TEMPERATURE: 99 F | BODY MASS INDEX: 24.5 KG/M2

## 2017-10-14 DIAGNOSIS — N20.0 KIDNEY STONE: Primary | ICD-10-CM

## 2017-10-14 PROCEDURE — 96375 TX/PRO/DX INJ NEW DRUG ADDON: CPT

## 2017-10-14 PROCEDURE — 99284 EMERGENCY DEPT VISIT MOD MDM: CPT

## 2017-10-14 PROCEDURE — 80053 COMPREHEN METABOLIC PANEL: CPT | Performed by: EMERGENCY MEDICINE

## 2017-10-14 PROCEDURE — 81001 URINALYSIS AUTO W/SCOPE: CPT | Performed by: EMERGENCY MEDICINE

## 2017-10-14 PROCEDURE — 74176 CT ABD & PELVIS W/O CONTRAST: CPT | Performed by: EMERGENCY MEDICINE

## 2017-10-14 PROCEDURE — 96374 THER/PROPH/DIAG INJ IV PUSH: CPT

## 2017-10-14 PROCEDURE — 87086 URINE CULTURE/COLONY COUNT: CPT | Performed by: EMERGENCY MEDICINE

## 2017-10-14 PROCEDURE — 83690 ASSAY OF LIPASE: CPT | Performed by: EMERGENCY MEDICINE

## 2017-10-14 PROCEDURE — 85025 COMPLETE CBC W/AUTO DIFF WBC: CPT | Performed by: EMERGENCY MEDICINE

## 2017-10-14 RX ORDER — ONDANSETRON 4 MG/1
4 TABLET, ORALLY DISINTEGRATING ORAL EVERY 4 HOURS PRN
Qty: 20 TABLET | Refills: 0 | Status: SHIPPED | OUTPATIENT
Start: 2017-10-14 | End: 2017-10-19

## 2017-10-14 RX ORDER — ONDANSETRON 2 MG/ML
4 INJECTION INTRAMUSCULAR; INTRAVENOUS ONCE
Status: COMPLETED | OUTPATIENT
Start: 2017-10-14 | End: 2017-10-14

## 2017-10-14 RX ORDER — TRAMADOL HYDROCHLORIDE 50 MG/1
50 TABLET ORAL EVERY 6 HOURS PRN
Qty: 20 TABLET | Refills: 0 | Status: SHIPPED | OUTPATIENT
Start: 2017-10-14 | End: 2017-10-21

## 2017-10-14 RX ORDER — HYDROMORPHONE HYDROCHLORIDE 1 MG/ML
1 INJECTION, SOLUTION INTRAMUSCULAR; INTRAVENOUS; SUBCUTANEOUS EVERY 30 MIN PRN
Status: DISCONTINUED | OUTPATIENT
Start: 2017-10-14 | End: 2017-10-14

## 2017-10-14 RX ORDER — KETOROLAC TROMETHAMINE 30 MG/ML
15 INJECTION, SOLUTION INTRAMUSCULAR; INTRAVENOUS ONCE
Status: COMPLETED | OUTPATIENT
Start: 2017-10-14 | End: 2017-10-14

## 2017-10-14 NOTE — ED PROVIDER NOTES
Patient Seen in: [facility] Emergency Department    History   Patient presents with:  Abdomen/Flank Pain (GI/)    Stated Complaint: flank/abd pain    HPI    Patient is a 14-year-old male comes emergency room for evaluation of right-sided flank tesha accommodation, extraocular motion is intact, sclerae white, conjunctiva is pink. Oropharynx is unremarkable, no exudate. NECK: Supple, trachea midline, no lymphadenopathy.    LUNG: Lungs clear to auscultation bilaterally, no wheezing, no rales, no rhonchi amount of ascites.   There are a few small stones 2 mm or smaller at the right UVJ    Medications   HYDROmorphone HCl PF (DILAUDID) 1 MG/ML injection 1 mg (1 mg Intravenous Given 10/14/17 0056)   ondansetron HCl (ZOFRAN) injection 4 mg (4 mg Intravenous Giv diagnosis)    Disposition:  Discharge    Follow-up:  Marita Schuler 35  [address] [address] [phone]      As needed    Sal Herr MD  [address] [place] [address] [phone]      As needed

## 2017-12-18 ENCOUNTER — OFFICE VISIT (OUTPATIENT)
Dept: INTERNAL MEDICINE CLINIC | Facility: CLINIC | Age: 37
End: 2017-12-18

## 2017-12-18 VITALS
SYSTOLIC BLOOD PRESSURE: 114 MMHG | BODY MASS INDEX: 25.9 KG/M2 | HEIGHT: 71 IN | DIASTOLIC BLOOD PRESSURE: 60 MMHG | WEIGHT: 185 LBS | HEART RATE: 92 BPM | TEMPERATURE: 98 F

## 2017-12-18 DIAGNOSIS — M25.512 CHRONIC LEFT SHOULDER PAIN: Primary | ICD-10-CM

## 2017-12-18 DIAGNOSIS — G89.29 CHRONIC LEFT SHOULDER PAIN: Primary | ICD-10-CM

## 2017-12-18 PROCEDURE — 99213 OFFICE O/P EST LOW 20 MIN: CPT | Performed by: NURSE PRACTITIONER

## 2017-12-18 RX ORDER — NAPROXEN 500 MG/1
500 TABLET ORAL 2 TIMES DAILY WITH MEALS
Qty: 14 TABLET | Refills: 0 | Status: SHIPPED | OUTPATIENT
Start: 2017-12-18

## 2017-12-18 NOTE — PATIENT INSTRUCTIONS
Take Naproxen 1 tab, twice daily, until all tabs are gone. Space doses 12 hours apart for best effect. TAKE WITH FOOD. Do not take any Advil, Motrin, Aleve or ibuprofen products while taking this medication.

## 2017-12-18 NOTE — PROGRESS NOTES
Patient presents with:  Shoulder Pain: left shoulder pain for about 7-8 years from playing football. He is having lilmited range of motion       HPI:  Presents with approx 7-8 year history of left shoulder pain. Worsened in the last 3 months after golfing. crepitus noted. ROM seems intact but noted with increased pain with lateral abduction and also anteriorly raising arm past level of shoulder. Muscle strength 5+. Skin: Skin is warm and dry. No rash noted. No erythema. No pallor.        A/P:    Chronic lef

## 2017-12-19 ENCOUNTER — HOSPITAL ENCOUNTER (OUTPATIENT)
Dept: GENERAL RADIOLOGY | Age: 37
Discharge: HOME | End: 2017-12-19
Attending: NURSE PRACTITIONER
Payer: MEDICAID

## 2017-12-19 DIAGNOSIS — M25.512 CHRONIC LEFT SHOULDER PAIN: ICD-10-CM

## 2017-12-19 DIAGNOSIS — G89.29 CHRONIC LEFT SHOULDER PAIN: ICD-10-CM

## 2017-12-19 PROCEDURE — 73030 X-RAY EXAM OF SHOULDER: CPT | Performed by: NURSE PRACTITIONER

## 2017-12-20 NOTE — PROGRESS NOTES
[facility]  [address], [address]  [address]  [phone] ([phone]) Diagnosis Date   • Ascites    • Cirrhosis, alcoholic ([facility] [address]    • Esophageal varices in cirrhosis [facility])       Past Surgical History:  No date: ABD PARACENTESIS   Family History   Problem Relation Age of Onset   • Heart Disorder Maternal Grandfather       S A/B serology with next set of labs      Follow Up: 3 months     Zeynep Hernandez, NP  Hepatology Nurse Practitioner   [facility] [place], [place]   [address], [address]  [place], [place], [address] [facility] (offic

## 2018-01-05 ENCOUNTER — TELEPHONE (OUTPATIENT)
Dept: SURGERY | Facility: CLINIC | Age: 38
End: 2018-01-05

## 2018-01-08 ENCOUNTER — HOSPITAL ENCOUNTER (OUTPATIENT)
Dept: MRI IMAGING | Age: 38
Discharge: HOME | End: 2018-01-08
Attending: NURSE PRACTITIONER
Payer: MEDICAID

## 2018-01-08 DIAGNOSIS — K70.10 ALCOHOLIC HEPATITIS WITHOUT ASCITES: ICD-10-CM

## 2018-01-08 PROCEDURE — 74183 MRI ABD W/O CNTR FLWD CNTR: CPT | Performed by: NURSE PRACTITIONER

## 2018-01-08 PROCEDURE — A9581 GADOXETATE DISODIUM INJ: HCPCS | Performed by: NURSE PRACTITIONER

## 2018-01-10 NOTE — TELEPHONE ENCOUNTER
Discussed MRI results with patient- no PVT and improved liver morphology but platelet low and splenomegaly indicative of cirrhosis. [facility] (U/S is fine) and labs q6 months. Ultrasound and labs ordered for June 2017.

## 2020-11-24 NOTE — PROGRESS NOTES
Addended by: ARCHANA WILLIAMSON on: 11/24/2020 04:36 PM     Modules accepted: Orders     Patient was discharged home from [facility] on 7/16/17.   Discharge DX: Ascites due to alcoholic cirrhosis, right lower extremity swelling    How have you been since your discharge from the hospital?  Per Tristian Massey he is feeling better, not trying to do to

## 2022-10-19 ENCOUNTER — TELEPHONE (OUTPATIENT)
Dept: OBGYN CLINIC | Facility: CLINIC | Age: 42
End: 2022-10-19

## 2022-10-19 DIAGNOSIS — Z31.41 FERTILITY TESTING: Primary | ICD-10-CM

## 2022-10-19 NOTE — TELEPHONE ENCOUNTER
Spouse of JJF patient ([id number]) requesting semen analysis for partner.  Order entered and patient informed by John Graham RN

## 2022-10-21 ENCOUNTER — NURSE ONLY (OUTPATIENT)
Dept: LAB | Facility: HOSPITAL | Age: 42
End: 2022-10-21
Attending: OBSTETRICS & GYNECOLOGY

## 2022-10-21 DIAGNOSIS — Z31.41 FERTILITY TESTING: ICD-10-CM

## 2022-10-21 PROCEDURE — 89320 SEMEN ANAL VOL/COUNT/MOT: CPT

## 2022-11-05 ENCOUNTER — LAB ENCOUNTER (OUTPATIENT)
Dept: LAB | Facility: HOSPITAL | Age: 42
End: 2022-11-05
Attending: OBSTETRICS & GYNECOLOGY

## 2022-11-05 DIAGNOSIS — Z31.41 FERTILITY TESTING: ICD-10-CM

## 2022-11-05 LAB
PH SMN: 8.5 [PH] (ref 7.2–8.3)
SPECIMEN VOL SMN: 4.5 ML (ref 1.5–6)
SPERM # SMN: 65.5 MILL/ML (ref 15–150)
SPERM IMMOTILE NFR SMN: 46 %
SPERM IMMOTILE NFR SMN: 8 %
SPERM PROG NFR SMN: 46 %
VISC SMN QL: NORMAL

## 2022-11-05 PROCEDURE — 89320 SEMEN ANAL VOL/COUNT/MOT: CPT

## 2024-05-02 ENCOUNTER — HOSPITAL ENCOUNTER (OUTPATIENT)
Age: 44
Discharge: HOME | End: 2024-05-02
Attending: EMERGENCY MEDICINE
Payer: COMMERCIAL

## 2024-05-02 ENCOUNTER — APPOINTMENT (OUTPATIENT)
Dept: GENERAL RADIOLOGY | Age: 44
End: 2024-05-02
Attending: EMERGENCY MEDICINE
Payer: COMMERCIAL

## 2024-05-02 VITALS
DIASTOLIC BLOOD PRESSURE: 81 MMHG | HEART RATE: 94 BPM | RESPIRATION RATE: 16 BRPM | SYSTOLIC BLOOD PRESSURE: 116 MMHG | HEIGHT: 71 IN | OXYGEN SATURATION: 99 % | TEMPERATURE: 98 F | WEIGHT: 188 LBS | BODY MASS INDEX: 26.32 KG/M2

## 2024-05-02 DIAGNOSIS — S93.602A SPRAIN OF LEFT FOOT, INITIAL ENCOUNTER: Primary | ICD-10-CM

## 2024-05-02 PROCEDURE — 99203 OFFICE O/P NEW LOW 30 MIN: CPT

## 2024-05-02 PROCEDURE — 73630 X-RAY EXAM OF FOOT: CPT | Performed by: EMERGENCY MEDICINE

## 2024-05-02 NOTE — DISCHARGE INSTRUCTIONS
As little weightbearing as possible  Rest  Elevate your injured extremity  Apply cool compresses for 20 minutes at a time.    Follow up with your doctor within a few days

## 2024-05-02 NOTE — ED PROVIDER NOTES
Patient Seen in: [facility]      History     Chief Complaint   Patient presents with    Foot Pain     Stated Complaint: left foot pain    Subjective:   HPI    Patient was in his yard when the dog next-door got through the fence and was chasing him showing his teeth.  Patient tried to evade the dog but in doing so twisted his foot.  He now complains of pain over the dorsum proximal aspect of the foot with painful weightbearing.  No other injury    Objective:   Past Medical History:    Ascites    Cirrhosis, alcoholic (HCC)    Esophageal varices in cirrhosis (HCC)              Past Surgical History:   Procedure Laterality Date    Abd paracentesis                  Social History     Socioeconomic History    Marital status:    Tobacco Use    Smoking status: Former     Types: Cigars    Smokeless tobacco: Never   Substance and Sexual Activity    Alcohol use: No    Drug use: No              Review of Systems    Positive for stated complaint: left foot pain  Other systems are as noted in HPI.  Constitutional and vital signs reviewed.      All other systems reviewed and negative except as noted above.    Physical Exam     ED Triage Vitals [05/02/24 1349]   /81   Pulse 94   Resp 16   Temp 98.4 °F (36.9 °C)   Temp src Temporal   SpO2 99 %   O2 Device None (Room air)       Current:/81   Pulse 94   Temp 98.4 °F (36.9 °C) (Temporal)   Resp 16   Ht 180.3 cm (5' 11\")   Wt 85.3 kg   SpO2 99%   BMI 26.22 kg/m²         Physical Exam  Foot: On inspection, there is no obvious deformity noted.  There is some tenderness of the proximal dorsum of the foot.  No tenderness over the malleoli.  Proximal fifth metatarsal is nontender.  The lower leg and proximal fibula are nontender.  Dorsalis pedis pulse intact.  This cap refill intact.  Distal sensation intact light touch       ED Course   Labs Reviewed - No data to display                   MDM     Patient with a twisting injury to his foot.   Likely, this is sprain although avulsion fracture also consideration.    X-ray foot  CONCLUSION:       Negative for fracture or malalignment.  Normal mineralization.  Joint spaces are preserved.  No periarticular erosions.  No focal soft tissue swelling.     I recommend:  As little weightbearing as possible  Rest  Elevate your injured extremity  Apply cool compresses for 20 minutes at a time.    Follow up with your doctor within a few days                           Medical Decision Making      Disposition and Plan     Clinical Impression:  1. Sprain of left foot, initial encounter         Disposition:  Discharge  5/2/2024  2:54 pm    Follow-up:  No follow-up provider specified.        Medications Prescribed:  Current Discharge Medication List

## (undated) DEVICE — ENDOSCOPY PACK UPPER: Brand: MEDLINE INDUSTRIES, INC.

## (undated) DEVICE — FILTERLINE NASAL ADULT O2/CO2

## (undated) DEVICE — 3M™ RED DOT™ MONITORING ELECTRODE WITH FOAM TAPE AND STICKY GEL, 50/BAG, 20/CASE, 72/PLT 2570: Brand: RED DOT™

## (undated) DEVICE — MEDI-VAC SUCTION HANDLE REGULAR CAPACITY: Brand: CARDINAL HEALTH

## (undated) DEVICE — Device: Brand: DEFENDO AIR/WATER/SUCTION AND BIOPSY VALVE

## (undated) DEVICE — 1200CC GUARDIAN II: Brand: GUARDIAN

## (undated) DEVICE — MEDI-VAC NON-CONDUCTIVE SUCTION TUBING: Brand: CARDINAL HEALTH

## (undated) NOTE — Clinical Note
Chucho Chew, Any way you could See Mr Alejandro Zazueta sooner, has appt 8/21. Recent discharge from [facility] with acute alcholoic hepatitis, partial portal vein thrombosis, ascites s/p paracentesis of about 7L during his stay, still with jaundice, repeating labs today.

## (undated) NOTE — ED AVS SNAPSHOT
Mr. Penny Sirena   MRN: [id number]    Department:  [facility] Emergency Department   Date of Visit:  10/14/2017           Disclosure     Insurance plans vary and the physician(s) referred by the ER may not be covered by your plan.  Please contac If you have been prescribed any medication(s), please fill your prescription right away and begin taking the medication(s) as directed    If the emergency physician has read X-rays, these will be re-interpreted by a radiologist.  If there is a significant

## (undated) NOTE — LETTER
[facility] [address], [address]    Consent for Operation    Date: 7/6/2017    Time:   1.  I authorize the performance upon Neela Bernard the following operation:    Procedure(s):  Esophagogastroduodenoscopy with possible biopsy procedure has been videotaped, the surgeon will obtain the original videotape. The hospital will not be responsible for storage or maintenance of this tape.     6. For the purpose of advancing medical education, I consent to the admittance of observers to t STATEMENTS REQUIRING INSERTION OR COMPLETION WERE FILLED IN.     Signature of Patient:   ___________________________    When the patient is a minor or mentally incompetent to give consent:  Signature of person authorized to consent for patient: ____________ drugs/illegal medications). Failure to inform my anesthesiologist about these medicines may increase my risk of anesthetic complications. · If I am allergic to anything or have had a reaction to anesthesia before.     3. I understand how the anesthesia med I have discussed the procedure and information above with the patient (or patient’s representative) and answered their questions. The patient or their representative has agreed to have anesthesia services.     _______________________________________________

## (undated) NOTE — Clinical Note
[facility] hospital follow up call made. Patient was recommended to follow up by 7/23/17. Due to limited available appointments NCM sent message to MD's office to assist with scheduling hospital follow up appointment.  HFU (no TCM)

## (undated) NOTE — LETTER
[facility] [address], [address] [place] [address]    Consent for Anesthesia   1.    IMekhi agree to be cared for by an anesthesiologist, who is specially trained to monitor me and give me medicine to put me to sleep or keep me comforta vision, nerves, or muscles and in extremely rare instances death. 5. My doctor has explained to me other choices available to me for my care (alternatives).   6. Pregnant Patients (“epidural”):  I understand that the risks of having an epidural (medicine g